# Patient Record
Sex: MALE | Race: WHITE | Employment: OTHER | URBAN - METROPOLITAN AREA
[De-identification: names, ages, dates, MRNs, and addresses within clinical notes are randomized per-mention and may not be internally consistent; named-entity substitution may affect disease eponyms.]

---

## 2017-07-11 ENCOUNTER — APPOINTMENT (OUTPATIENT)
Dept: CT IMAGING | Age: 62
DRG: 377 | End: 2017-07-11
Attending: EMERGENCY MEDICINE
Payer: COMMERCIAL

## 2017-07-11 ENCOUNTER — HOSPITAL ENCOUNTER (INPATIENT)
Age: 62
LOS: 2 days | Discharge: HOME | DRG: 377 | End: 2017-07-13
Attending: EMERGENCY MEDICINE | Admitting: INTERNAL MEDICINE
Payer: COMMERCIAL

## 2017-07-11 DIAGNOSIS — K92.2 GASTROINTESTINAL HEMORRHAGE, UNSPECIFIED GASTROINTESTINAL HEMORRHAGE TYPE: Primary | ICD-10-CM

## 2017-07-11 DIAGNOSIS — C90.00 MULTIPLE MYELOMA NOT HAVING ACHIEVED REMISSION (HCC): ICD-10-CM

## 2017-07-11 DIAGNOSIS — D64.9 ANEMIA, UNSPECIFIED TYPE: ICD-10-CM

## 2017-07-11 DIAGNOSIS — D69.6 THROMBOCYTOPENIA (HCC): ICD-10-CM

## 2017-07-11 PROBLEM — D61.810 PANCYTOPENIA DUE TO CHEMOTHERAPY (HCC): Status: ACTIVE | Noted: 2017-07-11

## 2017-07-11 PROBLEM — R19.7 DIARRHEA: Status: ACTIVE | Noted: 2017-07-11

## 2017-07-11 PROBLEM — D62 ANEMIA ASSOCIATED WITH ACUTE BLOOD LOSS: Status: ACTIVE | Noted: 2017-07-11

## 2017-07-11 PROBLEM — I95.89 HYPOTENSION, CHRONIC: Status: ACTIVE | Noted: 2017-07-11

## 2017-07-11 PROBLEM — E87.6 HYPOKALEMIA: Status: ACTIVE | Noted: 2017-07-11

## 2017-07-11 LAB
ALBUMIN SERPL BCP-MCNC: 2.2 G/DL (ref 3.5–5)
ALBUMIN/GLOB SERPL: 0.8 {RATIO} (ref 1.1–2.2)
ALP SERPL-CCNC: 107 U/L (ref 45–117)
ALT SERPL-CCNC: 25 U/L (ref 12–78)
ANION GAP BLD CALC-SCNC: 9 MMOL/L (ref 5–15)
AST SERPL W P-5'-P-CCNC: 31 U/L (ref 15–37)
ATRIAL RATE: 114 BPM
BASOPHILS # BLD AUTO: 0 K/UL (ref 0–0.1)
BASOPHILS # BLD: 0 % (ref 0–1)
BILIRUB SERPL-MCNC: 0.3 MG/DL (ref 0.2–1)
BUN SERPL-MCNC: 21 MG/DL (ref 6–20)
BUN/CREAT SERPL: 19 (ref 12–20)
CALCIUM SERPL-MCNC: 8.2 MG/DL (ref 8.5–10.1)
CALCULATED P AXIS, ECG09: 89 DEGREES
CALCULATED R AXIS, ECG10: 33 DEGREES
CALCULATED T AXIS, ECG11: 162 DEGREES
CHLORIDE SERPL-SCNC: 103 MMOL/L (ref 97–108)
CO2 SERPL-SCNC: 25 MMOL/L (ref 21–32)
CREAT SERPL-MCNC: 1.11 MG/DL (ref 0.7–1.3)
DIAGNOSIS, 93000: NORMAL
EOSINOPHIL # BLD: 0 K/UL (ref 0–0.4)
EOSINOPHIL NFR BLD: 0 % (ref 0–7)
ERYTHROCYTE [DISTWIDTH] IN BLOOD BY AUTOMATED COUNT: 19.4 % (ref 11.5–14.5)
GLOBULIN SER CALC-MCNC: 2.7 G/DL (ref 2–4)
GLUCOSE SERPL-MCNC: 114 MG/DL (ref 65–100)
HCT VFR BLD AUTO: 18.4 % (ref 36.6–50.3)
HGB BLD-MCNC: 6 G/DL (ref 12.1–17)
INR PPP: 1.1 (ref 0.9–1.1)
LYMPHOCYTES # BLD AUTO: 35 % (ref 12–49)
LYMPHOCYTES # BLD: 1.2 K/UL (ref 0.8–3.5)
MCH RBC QN AUTO: 30.6 PG (ref 26–34)
MCHC RBC AUTO-ENTMCNC: 32.6 G/DL (ref 30–36.5)
MCV RBC AUTO: 93.9 FL (ref 80–99)
MONOCYTES # BLD: 0.3 K/UL (ref 0–1)
MONOCYTES NFR BLD AUTO: 9 % (ref 5–13)
NEUTS SEG # BLD: 1.9 K/UL (ref 1.8–8)
NEUTS SEG NFR BLD AUTO: 56 % (ref 32–75)
P-R INTERVAL, ECG05: 290 MS
PLATELET # BLD AUTO: 15 K/UL (ref 150–400)
POTASSIUM SERPL-SCNC: 3.4 MMOL/L (ref 3.5–5.1)
PROT SERPL-MCNC: 4.9 G/DL (ref 6.4–8.2)
PROTHROMBIN TIME: 10.7 SEC (ref 9–11.1)
Q-T INTERVAL, ECG07: 298 MS
QRS DURATION, ECG06: 70 MS
QTC CALCULATION (BEZET), ECG08: 410 MS
RBC # BLD AUTO: 1.96 M/UL (ref 4.1–5.7)
RBC MORPH BLD: ABNORMAL
SODIUM SERPL-SCNC: 137 MMOL/L (ref 136–145)
VENTRICULAR RATE, ECG03: 114 BPM
WBC # BLD AUTO: 3.4 K/UL (ref 4.1–11.1)

## 2017-07-11 PROCEDURE — 85025 COMPLETE CBC W/AUTO DIFF WBC: CPT | Performed by: EMERGENCY MEDICINE

## 2017-07-11 PROCEDURE — 80053 COMPREHEN METABOLIC PANEL: CPT | Performed by: EMERGENCY MEDICINE

## 2017-07-11 PROCEDURE — 86880 COOMBS TEST DIRECT: CPT | Performed by: EMERGENCY MEDICINE

## 2017-07-11 PROCEDURE — 86970 RBC PRETX INCUBATJ W/CHEMICL: CPT

## 2017-07-11 PROCEDURE — 74011000250 HC RX REV CODE- 250: Performed by: INTERNAL MEDICINE

## 2017-07-11 PROCEDURE — 86860 RBC ANTIBODY ELUTION: CPT | Performed by: EMERGENCY MEDICINE

## 2017-07-11 PROCEDURE — 74178 CT ABD&PLV WO CNTR FLWD CNTR: CPT

## 2017-07-11 PROCEDURE — 77030037877 HC DRSG MEPILEX >48IN BORD MOLN -A

## 2017-07-11 PROCEDURE — 74011250636 HC RX REV CODE- 250/636: Performed by: INTERNAL MEDICINE

## 2017-07-11 PROCEDURE — 77030013131 HC IV BLD ST ICUM -A

## 2017-07-11 PROCEDURE — 86922 COMPATIBILITY TEST ANTIGLOB: CPT | Performed by: EMERGENCY MEDICINE

## 2017-07-11 PROCEDURE — 36430 TRANSFUSION BLD/BLD COMPNT: CPT

## 2017-07-11 PROCEDURE — P9037 PLATE PHERES LEUKOREDU IRRAD: HCPCS | Performed by: EMERGENCY MEDICINE

## 2017-07-11 PROCEDURE — 86870 RBC ANTIBODY IDENTIFICATION: CPT | Performed by: EMERGENCY MEDICINE

## 2017-07-11 PROCEDURE — 93005 ELECTROCARDIOGRAM TRACING: CPT

## 2017-07-11 PROCEDURE — 74011250637 HC RX REV CODE- 250/637: Performed by: INTERNAL MEDICINE

## 2017-07-11 PROCEDURE — 86885 COOMBS TEST INDIRECT QUAL: CPT

## 2017-07-11 PROCEDURE — C9113 INJ PANTOPRAZOLE SODIUM, VIA: HCPCS | Performed by: INTERNAL MEDICINE

## 2017-07-11 PROCEDURE — 74011250636 HC RX REV CODE- 250/636: Performed by: EMERGENCY MEDICINE

## 2017-07-11 PROCEDURE — 86900 BLOOD TYPING SEROLOGIC ABO: CPT | Performed by: EMERGENCY MEDICINE

## 2017-07-11 PROCEDURE — 96361 HYDRATE IV INFUSION ADD-ON: CPT

## 2017-07-11 PROCEDURE — 86920 COMPATIBILITY TEST SPIN: CPT | Performed by: EMERGENCY MEDICINE

## 2017-07-11 PROCEDURE — 86860 RBC ANTIBODY ELUTION: CPT

## 2017-07-11 PROCEDURE — 86902 BLOOD TYPE ANTIGEN DONOR EA: CPT | Performed by: EMERGENCY MEDICINE

## 2017-07-11 PROCEDURE — P9040 RBC LEUKOREDUCED IRRADIATED: HCPCS | Performed by: EMERGENCY MEDICINE

## 2017-07-11 PROCEDURE — 74011636320 HC RX REV CODE- 636/320: Performed by: RADIOLOGY

## 2017-07-11 PROCEDURE — 30233N1 TRANSFUSION OF NONAUTOLOGOUS RED BLOOD CELLS INTO PERIPHERAL VEIN, PERCUTANEOUS APPROACH: ICD-10-PCS | Performed by: EMERGENCY MEDICINE

## 2017-07-11 PROCEDURE — 86850 RBC ANTIBODY SCREEN: CPT

## 2017-07-11 PROCEDURE — 99285 EMERGENCY DEPT VISIT HI MDM: CPT

## 2017-07-11 PROCEDURE — 30233R1 TRANSFUSION OF NONAUTOLOGOUS PLATELETS INTO PERIPHERAL VEIN, PERCUTANEOUS APPROACH: ICD-10-PCS | Performed by: EMERGENCY MEDICINE

## 2017-07-11 PROCEDURE — 86880 COOMBS TEST DIRECT: CPT

## 2017-07-11 PROCEDURE — 86870 RBC ANTIBODY IDENTIFICATION: CPT

## 2017-07-11 PROCEDURE — 36415 COLL VENOUS BLD VENIPUNCTURE: CPT | Performed by: EMERGENCY MEDICINE

## 2017-07-11 PROCEDURE — 85610 PROTHROMBIN TIME: CPT | Performed by: EMERGENCY MEDICINE

## 2017-07-11 PROCEDURE — 86921 COMPATIBILITY TEST INCUBATE: CPT | Performed by: EMERGENCY MEDICINE

## 2017-07-11 PROCEDURE — 65610000006 HC RM INTENSIVE CARE

## 2017-07-11 PROCEDURE — C9113 INJ PANTOPRAZOLE SODIUM, VIA: HCPCS | Performed by: EMERGENCY MEDICINE

## 2017-07-11 PROCEDURE — 96374 THER/PROPH/DIAG INJ IV PUSH: CPT

## 2017-07-11 RX ORDER — ACETAMINOPHEN 325 MG/1
650 TABLET ORAL
Status: DISCONTINUED | OUTPATIENT
Start: 2017-07-11 | End: 2017-07-13 | Stop reason: HOSPADM

## 2017-07-11 RX ORDER — MAGNESIUM CITRATE
296 SOLUTION, ORAL ORAL 2 TIMES DAILY
Status: DISPENSED | OUTPATIENT
Start: 2017-07-11 | End: 2017-07-12

## 2017-07-11 RX ORDER — MORPHINE SULFATE 2 MG/ML
1 INJECTION, SOLUTION INTRAMUSCULAR; INTRAVENOUS
Status: DISCONTINUED | OUTPATIENT
Start: 2017-07-11 | End: 2017-07-13 | Stop reason: HOSPADM

## 2017-07-11 RX ORDER — SODIUM CHLORIDE 9 MG/ML
500 INJECTION, SOLUTION INTRAVENOUS ONCE
Status: COMPLETED | OUTPATIENT
Start: 2017-07-11 | End: 2017-07-11

## 2017-07-11 RX ORDER — OLANZAPINE 5 MG/1
5 TABLET ORAL
COMMUNITY

## 2017-07-11 RX ORDER — DIPHENHYDRAMINE HYDROCHLORIDE 50 MG/ML
12.5 INJECTION, SOLUTION INTRAMUSCULAR; INTRAVENOUS
Status: DISCONTINUED | OUTPATIENT
Start: 2017-07-11 | End: 2017-07-13 | Stop reason: HOSPADM

## 2017-07-11 RX ORDER — SODIUM CHLORIDE 9 MG/ML
250 INJECTION, SOLUTION INTRAVENOUS AS NEEDED
Status: DISCONTINUED | OUTPATIENT
Start: 2017-07-11 | End: 2017-07-13 | Stop reason: HOSPADM

## 2017-07-11 RX ORDER — NALOXONE HYDROCHLORIDE 0.4 MG/ML
0.4 INJECTION, SOLUTION INTRAMUSCULAR; INTRAVENOUS; SUBCUTANEOUS AS NEEDED
Status: DISCONTINUED | OUTPATIENT
Start: 2017-07-11 | End: 2017-07-13 | Stop reason: HOSPADM

## 2017-07-11 RX ORDER — SODIUM CHLORIDE 9 MG/ML
50 INJECTION, SOLUTION INTRAVENOUS CONTINUOUS
Status: DISCONTINUED | OUTPATIENT
Start: 2017-07-11 | End: 2017-07-13 | Stop reason: HOSPADM

## 2017-07-11 RX ORDER — DEXAMETHASONE 4 MG/1
4 TABLET ORAL
COMMUNITY

## 2017-07-11 RX ORDER — SODIUM CHLORIDE 0.9 % (FLUSH) 0.9 %
5-10 SYRINGE (ML) INJECTION EVERY 8 HOURS
Status: DISCONTINUED | OUTPATIENT
Start: 2017-07-11 | End: 2017-07-13 | Stop reason: HOSPADM

## 2017-07-11 RX ORDER — PANTOPRAZOLE SODIUM 40 MG/1
80 TABLET, DELAYED RELEASE ORAL
Status: COMPLETED | OUTPATIENT
Start: 2017-07-11 | End: 2017-07-11

## 2017-07-11 RX ORDER — PANTOPRAZOLE SODIUM 40 MG/10ML
40 INJECTION, POWDER, LYOPHILIZED, FOR SOLUTION INTRAVENOUS EVERY 12 HOURS
Status: DISCONTINUED | OUTPATIENT
Start: 2017-07-11 | End: 2017-07-13 | Stop reason: HOSPADM

## 2017-07-11 RX ORDER — PREGABALIN 50 MG/1
100 CAPSULE ORAL DAILY
Status: DISCONTINUED | OUTPATIENT
Start: 2017-07-12 | End: 2017-07-13 | Stop reason: HOSPADM

## 2017-07-11 RX ORDER — PANTOPRAZOLE SODIUM 40 MG/10ML
40 INJECTION, POWDER, LYOPHILIZED, FOR SOLUTION INTRAVENOUS
Status: COMPLETED | OUTPATIENT
Start: 2017-07-11 | End: 2017-07-11

## 2017-07-11 RX ORDER — MONTELUKAST SODIUM 10 MG/1
10 TABLET ORAL
COMMUNITY

## 2017-07-11 RX ORDER — PHENOL/SODIUM PHENOLATE
20 AEROSOL, SPRAY (ML) MUCOUS MEMBRANE 2 TIMES DAILY
COMMUNITY

## 2017-07-11 RX ORDER — ACYCLOVIR 200 MG/5ML
400 SUSPENSION ORAL EVERY 12 HOURS
Status: DISCONTINUED | OUTPATIENT
Start: 2017-07-11 | End: 2017-07-13 | Stop reason: HOSPADM

## 2017-07-11 RX ORDER — METRONIDAZOLE 500 MG/100ML
500 INJECTION, SOLUTION INTRAVENOUS EVERY 8 HOURS
Status: DISCONTINUED | OUTPATIENT
Start: 2017-07-11 | End: 2017-07-13 | Stop reason: HOSPADM

## 2017-07-11 RX ORDER — PROCHLORPERAZINE EDISYLATE 5 MG/ML
5 INJECTION INTRAMUSCULAR; INTRAVENOUS
Status: DISCONTINUED | OUTPATIENT
Start: 2017-07-11 | End: 2017-07-13 | Stop reason: HOSPADM

## 2017-07-11 RX ORDER — ACYCLOVIR 200 MG/5ML
400 SUSPENSION ORAL EVERY 12 HOURS
COMMUNITY

## 2017-07-11 RX ORDER — MIDODRINE HYDROCHLORIDE 5 MG/1
10 TABLET ORAL
Status: DISCONTINUED | OUTPATIENT
Start: 2017-07-11 | End: 2017-07-13 | Stop reason: HOSPADM

## 2017-07-11 RX ORDER — MIDODRINE HYDROCHLORIDE 10 MG/1
10 TABLET ORAL
COMMUNITY

## 2017-07-11 RX ORDER — LEVOFLOXACIN 5 MG/ML
750 INJECTION, SOLUTION INTRAVENOUS EVERY 24 HOURS
Status: DISCONTINUED | OUTPATIENT
Start: 2017-07-11 | End: 2017-07-13 | Stop reason: HOSPADM

## 2017-07-11 RX ORDER — PREGABALIN 50 MG/1
100 CAPSULE ORAL DAILY
COMMUNITY

## 2017-07-11 RX ORDER — SODIUM CHLORIDE 0.9 % (FLUSH) 0.9 %
5-10 SYRINGE (ML) INJECTION AS NEEDED
Status: DISCONTINUED | OUTPATIENT
Start: 2017-07-11 | End: 2017-07-13 | Stop reason: HOSPADM

## 2017-07-11 RX ADMIN — PHENYLEPHRINE HYDROCHLORIDE 30 MCG/MIN: 10 INJECTION INTRAVENOUS at 12:40

## 2017-07-11 RX ADMIN — MIDODRINE HYDROCHLORIDE 10 MG: 5 TABLET ORAL at 17:18

## 2017-07-11 RX ADMIN — SODIUM CHLORIDE 1000 ML: 900 INJECTION, SOLUTION INTRAVENOUS at 06:53

## 2017-07-11 RX ADMIN — PANTOPRAZOLE SODIUM 40 MG: 40 INJECTION, POWDER, FOR SOLUTION INTRAVENOUS at 06:53

## 2017-07-11 RX ADMIN — PANTOPRAZOLE SODIUM 80 MG: 40 TABLET, DELAYED RELEASE ORAL at 08:01

## 2017-07-11 RX ADMIN — Medication 10 ML: at 21:24

## 2017-07-11 RX ADMIN — SODIUM CHLORIDE 500 ML: 900 INJECTION, SOLUTION INTRAVENOUS at 11:36

## 2017-07-11 RX ADMIN — METRONIDAZOLE 500 MG: 500 INJECTION, SOLUTION INTRAVENOUS at 19:52

## 2017-07-11 RX ADMIN — SODIUM CHLORIDE 50 ML/HR: 900 INJECTION, SOLUTION INTRAVENOUS at 15:20

## 2017-07-11 RX ADMIN — ACYCLOVIR 400 MG: 200 SUSPENSION ORAL at 16:05

## 2017-07-11 RX ADMIN — LEVOFLOXACIN 750 MG: 5 INJECTION, SOLUTION INTRAVENOUS at 11:53

## 2017-07-11 RX ADMIN — Medication 20 ML: at 09:51

## 2017-07-11 RX ADMIN — PANTOPRAZOLE SODIUM 40 MG: 40 INJECTION, POWDER, FOR SOLUTION INTRAVENOUS at 17:18

## 2017-07-11 RX ADMIN — PHENYLEPHRINE HYDROCHLORIDE 70 MCG/MIN: 10 INJECTION INTRAVENOUS at 13:33

## 2017-07-11 RX ADMIN — METRONIDAZOLE 500 MG: 500 INJECTION, SOLUTION INTRAVENOUS at 13:04

## 2017-07-11 RX ADMIN — PHENYLEPHRINE HYDROCHLORIDE 50 MCG/MIN: 10 INJECTION INTRAVENOUS at 13:02

## 2017-07-11 RX ADMIN — ACYCLOVIR 400 MG: 200 SUSPENSION ORAL at 21:24

## 2017-07-11 RX ADMIN — SODIUM CHLORIDE 50 ML/HR: 900 INJECTION, SOLUTION INTRAVENOUS at 20:34

## 2017-07-11 RX ADMIN — IOPAMIDOL 95 ML: 755 INJECTION, SOLUTION INTRAVENOUS at 09:13

## 2017-07-11 NOTE — H&P
[address]  [phone]    Admission History and Physical      NAME:              Janet Piper II   :   1955   MRN:  [id number]     PCP:  None     Date:     2017     Chief  Complaint: Bloody diarrhea    History Of Presenting Illness:       Mr. Esperanza Waller is a 57 y.o. male who is being admitted for acute GI bleeding. Mr. Esperanza Waller presented to our Emergency Department today complaining of acute onset of melena associated with bloody diarrhea. He had several episodes prior to coming to the ED. He denies any nausea, vomiting, fever, hematemesis or much abdominal discomfort. He has a hx of multiple myeloma and usually follows up at [facility] where he received platelets yesterday. He is due for chemotherapy Thursday. He was found to be hypotensive with pancytopenia. [facility] is on diversion and are not able to accept him today. He will be admitted for further management. No Known Allergies    Prior to Admission medications    Medication Sig Start Date End Date Taking? Authorizing Provider   pregabalin (LYRICA) 50 mg capsule Take 50 mg by mouth three (3) times daily. Yes Carolyn Espitia MD   midodrine (PROAMITINE) 10 mg tablet Take 10 mg by mouth three (3) times daily (with meals). Yes Carolyn Espitia MD   montelukast (SINGULAIR) 10 mg tablet Take 10 mg by mouth daily. Yes Carolyn Espitia MD   Omeprazole delayed release (PRILOSEC D/R) 20 mg tablet Take 20 mg by mouth daily. Yes Carolyn Espitia MD   acyclovir (ZOVIRAX) 200 mg/5 mL suspension Take 200 mg by mouth every twelve (12) hours. Yes Carolyn Espitia MD   dexamethasone (DECADRON) 1 mg tablet Take 4 mg by mouth two (2) days a week. Yes Carolyn Espitia MD   valACYclovir (VALTREX) 1 gram tablet Take 1,000 mg by mouth daily. Yes Carolyn Espitia MD   Biotin 2,500 mcg cap Take  by mouth. Yes Carolyn Espitia MD   multivitamin (ONE A DAY) tablet Take 1 Tab by mouth daily. Yes Carolyn Espitia MD   lenalidomide (REVLIMID) 20 mg cap Take 20 mg by mouth daily. 21 days    Carolyn Espitia MD   carfilzomib (KYPROLIS) 60 mg injection by IntraVENous route. Carolyn Espitia MD   rivaroxaban (XARELTO) 15 mg tab tablet Take 15 mg by mouth daily. Carolyn Espitia MD   nortriptyline (PAMELOR) 25 mg capsule Take 25 mg by mouth nightly. Carolyn Espitia MD   metoprolol (LOPRESSOR) 25 mg tablet Take 37.5 mg by mouth daily. Carolyn Espitia MD   testosterone (ANDROGEL) 1 % (25 mg/2.5gram) glpk 25 mg by TransDERmal route daily. Carolyn Espitia MD   calcium 500 mg tab Take  by mouth. Carolyn Espitia MD       Past Medical History:   Diagnosis Date    Multiple myeloma ([facility] [address]         Past Surgical History:   Procedure Laterality Date    HX ORTHOPAEDIC      R hip    HX ORTHOPAEDIC      titanium rods in back       Social History   Substance Use Topics    Smoking status: Never Smoker    Smokeless tobacco: Never Used    Alcohol use Yes      Comment: socially, wine        Family History   Problem Relation Age of Onset    Diabetes Neg Hx         Review of Systems:    Constitutional ROS: no fever, chills, rigors or night sweats  Respiratory ROS: no cough, sputum, hemoptysis, dyspnea or pleuritic pain. Cardiovascular ROS: no chest pain, palpitations, orthopnea, PND or syncope  Endocrine ROS: no polydispsia, polyuria, heat or cold intolerance or major weight change.   Gastrointestinal ROS: no dysphagia, abdominal pain, nausea or vomiting    Genito-Urinary ROS: no dysuria, frequency, hematuria, retention or flank pain  Musculoskeletal ROS: no joint pain, swelling or muscular tenderness  Neurological ROS: no headache, confusion, focal weakness or any other neurological symptoms  Psychiatric ROS: no depression, anxiety, mood swings  Dermatological ROS: no rash, pruritis, or urticaria  Heme-Lymph ROS: no swollen glands, bleeding    Examination:    Constitutional:    Visit Vitals    BP (!) 84/40    Pulse (!) 105    Temp 99.1 °F (37.3 °C)    Resp 18    Ht 5' 5\" (1.651 m)    Wt 56.2 kg (124 lb)    SpO2 100%    BMI 20.63 kg/m2      General:  Weak and ill looking patient, not in much acute distress   Eyes: Pale conjunctivae, PERRLA with no discharge. Normal eye movements  Ear, Nose, Mouth & Throat: No ottorrhea, rhinorrhea, non tender sinuses, dry mucous membranes  Respiratory:  No accessory muscle use, clear breath sounds without crackles or wheezes  Cardiovascular:  No JVD or murmurs, regular and normal S1, S2 without thrills, bruits or peripheral edema. GI & :  Soft abdomen, non-tender, non-distended, normoactive bowel sounds with no palpable organomegaly  Heme:  No cervical or axillary adenopathy. Musculoskeletal:  No cyanosis, clubbing, atrophy or deformities  Skin:  No rashes, bruising or ulcers   Neurological: Awake and alert, speech is clear, CNs 2-12 are grossly intact and otherwise non focal  Psychiatric:  Has a fair insight and is oriented x 3  ________________________________________________________________________    Data Review:    Labs:    Recent Labs      07/11/17   0550   WBC  3.4*   HGB  6.0*   HCT  18.4*   PLT  15*     Recent Labs      07/11/17   0550   NA  137   K  3.4*   CL  103   CO2  25   GLU  114*   BUN  21*   CREA  1.11   CA  8.2*   ALB  2.2*   SGOT  31   ALT  25     No components found for: GLPOC  No results for input(s): PH, PCO2, PO2, HCO3, FIO2 in the last 72 hours. Recent Labs      07/11/17   0550   INR  1.1     Other Medical tests:    Personally reviewed 12 lead EKG: Normal rate, rhythm, axis and intervals. and No acute changes suggestive of ischemia    I have also reviewed available old medical records.      Assessment & Impression:     Mr. Amish Bowen is a 57 y.o. male being evaluated for:     Principal Problem:    Acute GI bleeding (7/11/2017)    Active Problems:    Anemia associated with acute blood loss (7/11/2017)      Thrombocytopenia ([address] (7/11/2017)      Hypokalemia (7/11/2017)      Pancytopenia due to chemotherapy ([address] (7/11/2017)      Multiple myeloma ([address] (7/11/2017)      Diarrhea (7/11/2017)      Hypotension, chronic (7/11/2017)         Plan of management:    Acute GI bleeding (7/11/2017): admit to ICU due to risk of rapid deterioration. Start IV pantoprazole. Seen by GI. Obtaining a CTA abdomen. Transfuse as noted below. Pancytopenia due to chemotherapy ([address] (7/11/2017)/ Anemia associated with acute blood loss (7/11/2017)/ Thrombocytopenia ([address] (7/11/2017): transfuse platelets as well as PRBCs. Monitor labs post transfusion. Hypotension, chronic (7/11/2017): worsened by diarrhea. Resume Midodrine and transfuse with PRBCs. Multiple myeloma ([address] (7/11/2017): [place] [facility] Consult in house Oncology. Hypokalemia (7/11/2017): replete and follow BMP    Diarrhea (7/11/2017): presumed non infectious.  Will obtain stool cultures if persistent    Code Status:  Full    Surrogate decision maker: Family    Risk of deterioration: high      Total time spent for the care of the patient: Meng Randall 1952 discussed with: Patient, Family, Nursing Staff and ED physician    Discussed:  Code Status, Care Plan and D/C Planning    Prophylaxis:  H2B/PPI    Probable Disposition:  Home w/Family           ___________________________________________________    Attending Physician: Cindy Berman MD

## 2017-07-11 NOTE — IP AVS SNAPSHOT
Summary of Care Report The Summary of Care report has been created to help improve care coordination. Users with access to [employer] or [address] (Web-based application) may access additional patient information including the Discharge Summary. If you are not currently a [address] user and need more information, please call the number listed below in the [place] [address] section and ask to be connected with Medical Records. Facility Information Name Address Phone [address] [place] [id number] [phone] Patient Information Patient Name Sex WAQAS Swartz ([id number]) Male 1955 Discharge Information Admitting Provider Service Area Unit George Lai MD / Natalie Hutchison 3 Intensive Care / [phone] Discharge Provider Discharge Date/Time Discharge Disposition Destination (none) 2017 Afternoon (Pending) AHR (none) Patient Language Language ENGLISH [13] Hospital Problems as of 2017  Reviewed: 2017  7:41 AM by George Lai MD  
  
  
  
 Class Noted - Resolved Last Modified POA Active Problems * (Principal)Acute GI bleeding  2017 - Present 2017 by George Lai MD Yes Entered by George Lai MD  
  Anemia associated with acute blood loss  2017 - Present 2017 by George Lai MD Yes Entered by George Lai MD  
  Thrombocytopenia ([address]  2017 - Present 2017 by George Lai MD Yes Entered by George Lai MD  
  Hypokalemia  2017 - Present 2017 by George Lai MD Yes Entered by George Lai MD  
  Pancytopenia due to chemotherapy [facility])  2017 - Present 2017 by George Lai MD Yes   Entered by George Lai MD  
 Multiple myeloma ([facility] [address]  7/11/2017 - Present 7/11/2017 by Shruthi Delatorre MD Yes Entered by Shruthi Delatorre MD  
  Diarrhea  7/11/2017 - Present 7/11/2017 by Shruthi Delatorre MD Yes Entered by Shruthi Delatorre MD  
  Hypotension, chronic  7/11/2017 - Present 7/11/2017 by Shruthi Delatorre MD Yes Entered by Shruthi Delatorre MD  
  Acute colitis  7/12/2017 - Present 7/12/2017 by Shruthi Delatorre MD Yes Entered by Shruthi Delatorre MD  
  
Non-Hospital Problems as of 7/13/2017  Reviewed: 7/11/2017  7:41 AM by Shruthi Delatorre MD  
 None You are allergic to the following No active allergies Current Discharge Medication List  
  
START taking these medications Dose & Instructions Dispensing Information Comments  
 levoFLOXacin 750 mg tablet Commonly known as:  Loistine Dandre Dose:  750 mg Take 1 Tab by mouth daily for 5 days. Quantity:  5 Tab Refills:  0  
   
 metroNIDAZOLE 500 mg tablet Commonly known as:  FLAGYL Dose:  500 mg Take 1 Tab by mouth three (3) times daily for 5 days. Quantity:  15 Tab Refills:  0 CONTINUE these medications which have NOT CHANGED Dose & Instructions Dispensing Information Comments  
 acyclovir 200 mg/5 mL suspension Commonly known as:  ZOVIRAX Dose:  400 mg Take 400 mg by mouth every twelve (12) hours. Refills:  0  
   
 daratumumab 20 mg/mL injection Commonly known as:  DARZALEX  
 by IntraVENous route Every Thursday. Immunotherapy administered at [facility] Refills:  0  
   
 dexamethasone 4 mg tablet Commonly known as:  DECADRON Dose:  4 mg Take 4 mg by mouth Every Friday. The day after immunotherapy Refills:  0 LYRICA 50 mg capsule Generic drug:  pregabalin Dose:  100 mg Take 100 mg by mouth daily. Refills:  0  
   
 midodrine 10 mg tablet Commonly known as:  Maurie Paulo Dose:  10 mg Take 10 mg by mouth three (3) times daily (with meals). Refills:  0 montelukast 10 mg tablet Commonly known as:  SINGULAIR Dose:  10 mg Take 10 mg by mouth two (2) days a week. The day before and day of immunotherapy. Based on current schedule, take on Thursday and Friday. Refills:  0  
   
 multivitamin tablet Commonly known as:  ONE A DAY Dose:  1 Tab Take 1 Tab by mouth daily. Refills:  0  
   
 OLANZapine 5 mg tablet Commonly known as:  ZyPREXA Dose:  5 mg Take 5 mg by mouth nightly as needed. Refills:  0 Omeprazole delayed release 20 mg tablet Commonly known as:  PRILOSEC D/R Dose:  20 mg Take 20 mg by mouth two (2) times a day. Refills:  0 Surgery Information ID Date/Time Status Primary Surgeon All Procedures Location [id number] 2017 Canceled Naresh Munoz MD ESOPHAGOGASTRODUODENOSCOPY (EGD) [facility] ENDOSCOPY Follow-up Information Follow up With Details Comments Contact Info None   None ([phone]) Patient stated that they have no PCP Discharge Instructions HOSPITALIST DISCHARGE INSTRUCTIONS 
 
NAME: Emily Reyes II  
:  1955 MRN:  [id number] Date:     2017 ADMIT DATE: 2017 DISCHARGE DATE: 2017 PRINCIPAL ADMITTING DIAGNOSIS: 
Acute GI bleeding GI BLEED DISCHARGE DIAGNOSES: 
Principal Problem: 
  Acute GI bleeding (2017) Anemia associated with acute blood loss (2017) Thrombocytopenia ([facility] [address] (2017) Hypokalemia (2017) Pancytopenia due to chemotherapy ([facility] [address] (2017) Multiple myeloma ([facility] [address] (2017) Hypotension, chronic (2017) Acute colitis (2017) MEDICATIONS: 
 
· It is important that medications are taken exactly as they are prescribed on the discharge medication instructions and keep them your  in the bottles provided by the pharmacist.  
· Keep a list of the medication names, dosages, and times to be taken at all times. · Do not take other medications without consulting your doctor. Recommended diet:  Regular mechanical soft Diet Recommended activity: Activity as tolerated Post discharge care: 
 
Notify follow up health care provider or return to the emergency department if you cannot get hold of your doctor if you feel worse or experience symptoms similar to those that brought you to hospital 
 
Follow-up Information Follow up With Details Comments Contact Info Dr Tariq Delgadillo at [address] [facility] as scheduled Information obtained by : 
I understand that if any problems occur once I am at home I am to contact my physician and I understand and acknowledge receipt of the instructions indicated above. Physician's or R.N.'s Signature                                                                  Date/Time Patient or Representative Signature                                                          Date/Time Chart Review Routing History No Routing History on File

## 2017-07-11 NOTE — PROGRESS NOTES
[facility]: MED RECONCILIATION    Comments/Recommendations:    Patient awake to confirm date birth and allergies. Wife brought medication bottles and was knowledegable about patient's current medication regimen.  Patient receives daratumumab once weekly on Thursdays at [facility]. At that time, he takes dexamethasone and montelukast.    Asked if patient is on any blood thinners. Wife denies and says he was previously on rivaroxaban but has not had that since before January. Medications added:     · Olanzapine  · Darautumumab injection    Medications removed:    · Biotin  · Calcium  · Carfilzomib   · Lenalidomide  · Metoprolol  · Nortriptyline   · Rivaroxaban  · Testosterone gel  · Valacyclovir     Medication reconciliation completed with patients own medications at the patients bedside  · Counseled the patient to:  · Not use their own medication while in the hospital unless otherwise instructed  · Have a family member take the medication home as soon as possible  · If medication cannot be taken home, request that the patient notify the nurse so the medication may be locked up according to hospital policy    Information obtained from: patient's wife    Allergies: Review of patient's allergies indicates no known allergies. Prior to Admission Medications:     Prior to Admission Medications   Prescriptions Last Dose Informant Patient Reported? Taking? OLANZapine (ZYPREXA) 5 mg tablet 6/27/2017 Significant Other Yes Yes   Sig: Take 5 mg by mouth nightly as needed. Omeprazole delayed release (PRILOSEC D/R) 20 mg tablet 7/10/2017 at Unknown time Significant Other Yes Yes   Sig: Take 20 mg by mouth two (2) times a day. acyclovir (ZOVIRAX) 200 mg/5 mL suspension 7/10/2017 Significant Other Yes Yes   Sig: Take 400 mg by mouth every twelve (12) hours. daratumumab (DARZALEX) 20 mg/mL injection 7/6/2017 Significant Other Yes Yes   Sig: by IntraVENous route Every Thursday.  Immunotherapy administered at [facility]   dexamethasone (DECADRON) 4 mg tablet 7/7/2017 Significant Other Yes Yes   Sig: Take 4 mg by mouth Every Friday. The day after immunotherapy    midodrine (PROAMITINE) 10 mg tablet 7/10/2017 Significant Other Yes Yes   Sig: Take 10 mg by mouth three (3) times daily (with meals). montelukast (SINGULAIR) 10 mg tablet 7/6/2017 at Unknown time Significant Other Yes Yes   Sig: Take 10 mg by mouth two (2) days a week. The day before and day of immunotherapy. Based on current schedule, take on Thursday and Friday. multivitamin (ONE A DAY) tablet 7/10/2017 at Unknown time Significant Other Yes Yes   Sig: Take 1 Tab by mouth daily. pregabalin (LYRICA) 50 mg capsule 7/10/2017 at Unknown time Significant Other Yes Yes   Sig: Take 100 mg by mouth daily.           Cami Tran, PharmD Candidate [id number]

## 2017-07-11 NOTE — ED TRIAGE NOTES
Pt arrives via EMS for c/o of dark stools. Per pt he is followed by [facility] for coumadin level. Pt a&ox4. Per wife pt had dark tarry stools upon waking.  Per EMS BP 81/47

## 2017-07-11 NOTE — PROGRESS NOTES
Nurse manager informed me that pt and his wife have requested a transfer to [facility] ibuprofen discussed this with Dr Elbert Templeton and then I contacted [facility] transfer center requesting an ICU bed for pt. [facility] remains on diversion. [facility] states they do not know when diversion will be lifted  I will call the [facility] transfer center every two hours to check on their status regarding diversion. I have faxed the face sheet with demographics to the [facility] transfer center @ [phone]. The number to call to try to get pt into [facility] after I leave today is [phone]. I met with pt and his wife to discuss the plan. At this time,pt does not any endoscopy procedures performed here. Pt & wife were informed that it may be several days before [facility] goes off of diversion or it could be today or tomorrow. Pt.'s wife plans to call pt.'s oncologist (Dr Juan Beckford) to get his opinion regarding endoscopy. At home pt uses a wheelchair,standing device,BSC,sliding board,and hospital bed. Son has moved back into pt.'s home to assist pt with his care. At home,pt receives bed baths. Wife prefers not to have home health unless absolutely necessary. Plan discussed with Dr Elbert Templeton and pt.'s nurse.   Mavis Brower

## 2017-07-11 NOTE — IP AVS SNAPSHOT
Liz Rangel 
 
 
 [address] [phone] Patient: Missy Wolfe 
MRN: [id number] PEM:[id number] You are allergic to the following No active allergies Recent Documentation Height Weight BMI Smoking Status 1.651 m 57 kg 20.91 kg/m2 Never Smoker Unresulted Labs Order Current Status SAMPLE TO BLOOD BANK In process SAMPLE TO BLOOD BANK In process SAMPLE TO BLOOD BANK In process SAMPLE TO BLOOD BANK In process CULTURE, STOOL Preliminary result PLATELETS, ALLOCATE Preliminary result TYPE & CROSSMATCH Preliminary result Emergency Contacts Name Discharge Info Relation Home Work Mobile HetalFronto Lyndsay [employer] 3. CAREGIVER [3] Spouse [3] [phone] About your hospitalization You were admitted on:  July 11, 2017 You last received care in the:  [facility] [place] [address] [facility] You were discharged on:  July 13, 2017 Unit phone number:  [phone] Why you were hospitalized Your primary diagnosis was:  Acute Gi Bleeding Your diagnoses also included:  Anemia Associated With Acute Blood Loss, Thrombocytopenia (Hcc), Hypokalemia, Pancytopenia Due To Chemotherapy (Hcc), Multiple Myeloma (Hcc), Diarrhea, Hypotension, Chronic, Acute Colitis Providers Seen During Your Hospitalizations Provider Role Specialty Primary office phone Shantell Stroud MD Attending Provider Emergency Medicine [phone] Livan Hernandez MD Attending Provider Internal Medicine [phone] Your Primary Care Physician (PCP) Primary Care Physician Office Phone Office Fax NONE ** None ** ** None ** Follow-up Information Follow up With Details Comments Contact Info None   None (395) Patient stated that they have no PCP Current Discharge Medication List  
  
START taking these medications Dose & Instructions Dispensing Information Comments Morning Noon Evening Bedtime  
 levoFLOXacin 750 mg tablet Commonly known as:  Jaimie Ramos Your last dose was: Your next dose is:    
   
   
 Dose:  750 mg Take 1 Tab by mouth daily for 5 days. Quantity:  5 Tab Refills:  0  
     
   
   
   
  
 metroNIDAZOLE 500 mg tablet Commonly known as:  FLAGYL Your last dose was: Your next dose is:    
   
   
 Dose:  500 mg Take 1 Tab by mouth three (3) times daily for 5 days. Quantity:  15 Tab Refills:  0 CONTINUE these medications which have NOT CHANGED Dose & Instructions Dispensing Information Comments Morning Noon Evening Bedtime  
 acyclovir 200 mg/5 mL suspension Commonly known as:  ZOVIRAX Your last dose was: Your next dose is:    
   
   
 Dose:  400 mg Take 400 mg by mouth every twelve (12) hours. Refills:  0  
     
   
   
   
  
 daratumumab 20 mg/mL injection Commonly known as:  Atlalon Roquea Your last dose was: Your next dose is:    
   
   
 by IntraVENous route Every Thursday. Immunotherapy administered at [facility] Refills:  0  
     
   
   
   
  
 dexamethasone 4 mg tablet Commonly known as:  DECADRON Your last dose was: Your next dose is:    
   
   
 Dose:  4 mg Take 4 mg by mouth Every Friday. The day after immunotherapy Refills:  0 LYRICA 50 mg capsule Generic drug:  pregabalin Your last dose was: Your next dose is:    
   
   
 Dose:  100 mg Take 100 mg by mouth daily. Refills:  0  
     
   
   
   
  
 midodrine 10 mg tablet Commonly known as:  Anel Paulino Your last dose was: Your next dose is:    
   
   
 Dose:  10 mg Take 10 mg by mouth three (3) times daily (with meals). Refills:  0  
     
   
   
   
  
 montelukast 10 mg tablet Commonly known as:  SINGULAIR  
   
 Your last dose was: Your next dose is:    
   
   
 Dose:  10 mg Take 10 mg by mouth two (2) days a week. The day before and day of immunotherapy. Based on current schedule, take on Thursday and Friday. Refills:  0  
     
   
   
   
  
 multivitamin tablet Commonly known as:  ONE A DAY Your last dose was: Your next dose is:    
   
   
 Dose:  1 Tab Take 1 Tab by mouth daily. Refills:  0  
     
   
   
   
  
 OLANZapine 5 mg tablet Commonly known as:  ZyPREXA Your last dose was: Your next dose is:    
   
   
 Dose:  5 mg Take 5 mg by mouth nightly as needed. Refills:  0 Omeprazole delayed release 20 mg tablet Commonly known as:  PRILOSEC D/R Your last dose was: Your next dose is:    
   
   
 Dose:  20 mg Take 20 mg by mouth two (2) times a day. Refills:  0 Where to Get Your Medications Information on where to get these meds will be given to you by the nurse or doctor. ! Ask your nurse or doctor about these medications  
  levoFLOXacin 750 mg tablet  
 metroNIDAZOLE 500 mg tablet Discharge Instructions HOSPITALIST DISCHARGE INSTRUCTIONS 
 
NAME: Natalee Rossi VANIA  
:  1955 MRN:  [id number] Date:     2017 ADMIT DATE: 2017 DISCHARGE DATE: 2017 PRINCIPAL ADMITTING DIAGNOSIS: 
Acute GI bleeding GI BLEED DISCHARGE DIAGNOSES: 
Principal Problem: 
  Acute GI bleeding (2017) Anemia associated with acute blood loss (2017) Thrombocytopenia ([address] (2017) Hypokalemia (2017) Pancytopenia due to chemotherapy ([address] (2017) Multiple myeloma ([address] (2017) Hypotension, chronic (2017) Acute colitis (2017) MEDICATIONS: 
 
· It is important that medications are taken exactly as they are prescribed on the discharge medication instructions and keep them your  in the bottles provided by the pharmacist.  
· Keep a list of the medication names, dosages, and times to be taken at all times. · Do not take other medications without consulting your doctor. Recommended diet:  Regular mechanical soft Diet Recommended activity: Activity as tolerated Post discharge care: 
 
Notify follow up health care provider or return to the emergency department if you cannot get hold of your doctor if you feel worse or experience symptoms similar to those that brought you to hospital 
 
Follow-up Information Follow up With Details Comments Contact Info Dr Roxy Lowe at [facility] as scheduled Information obtained by : 
I understand that if any problems occur once I am at home I am to contact my physician and I understand and acknowledge receipt of the instructions indicated above. Physician's or R.N.'s Signature                                                                  Date/Time Patient or Representative Signature                                                          Date/Time Discharge Orders None [employer] Announcement We are excited to announce that we are making your provider's discharge notes available to you in [employer]. You will see these notes when they are completed and signed by the physician that discharged you from your recent hospital stay. If you have any questions or concerns about any information you see in [employer], please call the Health Information Department where you were seen or reach out to your Primary Care Provider for more information about your plan of care. Introducing [facility] SERVICES! [facility] introduces [email or web address] patient portal. Now you can access parts of your medical record, email your doctor's office, and request medication refills online. 1. In your internet browser, go to [email or web address] 2. Click on the First Time User? Click Here link in the Sign In box. You will see the New Member Sign Up page. 3. Enter your [email or web address] Access Code exactly as it appears below. You will not need to use this code after youve completed the sign-up process. If you do not sign up before the expiration date, you must request a new code. · [email or web address] Access Code: [email or web address] Expires: 10/9/2017  6:38 AM 
 
4. Enter the last four digits of your Social Security Number (xxxx) and Date of Birth (mm/dd/yyyy) as indicated and click Submit. You will be taken to the next sign-up page. 5. Create a [email or web address] ID. This will be your [email or web address] login ID and cannot be changed, so think of one that is secure and easy to remember. 6. Create a [email or web address] password. You can change your password at any time. 7. Enter your Password Reset Question and Answer. This can be used at a later time if you forget your password. 8. Enter your e-mail address. You will receive e-mail notification when new information is available in [address] 9. Click Sign Up. You can now view and download portions of your medical record. 10. Click the Download Summary menu link to download a portable copy of your medical information. If you have questions, please visit the Frequently Asked Questions section of the [email or web address] website. Remember, [email or web address] is NOT to be used for urgent needs. For medical emergencies, dial 911. Now available from your iPhone and Android! General Information Please provide this summary of care documentation to your next provider. Patient Signature:  ____________________________________________________________ Date:  ____________________________________________________________  
  
Arna Magen Provider Signature:  ____________________________________________________________ Date:  ____________________________________________________________

## 2017-07-11 NOTE — PROGRESS NOTES
Problem: Upper and Lower GI Bleed: Day 1  Goal: Diagnostic Test/Procedures  Outcome: Not Progressing Towards Goal  Patient refusing endo at this time. Waiting to hear from his oncologist.  Goal: Nutrition/Diet  Outcome: Not Progressing Towards Goal  NPO  Goal: Discharge Planning  Outcome: Not Progressing Towards Goal  Awaiting word from [facility]  Goal: Treatments/Interventions/Procedures  Outcome: Not Progressing Towards Goal  Awaiting word from oncologist about endo  Goal: Psychosocial  Outcome: Not Progressing Towards Goal  Wife very anxious. Support offered.   Goal: *Hemodynamically stable  Outcome: Not Progressing Towards Goal  SBP still low

## 2017-07-11 NOTE — CONSULTS
[address] [facility] Oncology at [facility]  [phone]    Hematology / Oncology Consult    Reason for Visit:   Markie Anand is a 57 y.o. male who is seen in consultation at the request of Dr. Tej Sterling  for evaluation of GI bleed, Multiple myeloma and thrombocytopenia. History of Present Illness:     Mr Jenniffer Huizar was admitted on 7/11/2017 from the ED when he presented with c/o acute bloody diarrhea. Hx of MM and is currently undergoing treatment at [facility] under the care of Dr Rich Mahoney. Has been receiving supportive care of  plt transfusions prn. ED labs Hgb 6 WBC 3.4  ANC 1.9  plt 15. Therefore he was admitted for further eval and management. Mr Jenniffer Huizar denies any pain, SOB or N/V. States appetite has been good for the last several weeks. Hx from his wife, Spenser Certain states her  woke up about 4:30 am and had a large BM that looked black and tarry with some bright red blood noted; then had a second one within a few minutes; she got nervous and called 911; before they could arrive he had another stool with blood noted as well. Denies blood in urine or nose bleeds or any emesis with blood. Reports he has had fevers off and on; running from 99.3-100.4. Has had admissions for fevers but they can never find the source. Has been on recent numerous abx in the hospital and was sent home with Levaquin and he just completed that a few days ago. His appetite has really improved over the last several days since he was started on Protonix and  Sucralfate. Has been mostly in the bed or couch since Jan when he had the fx femur. Was due to start PT at home. Has upper body strength so has been able to transfer himself to the car from the  with a board. Has been receiving mainly plt transfusions at [facility].      [facility] records reviewed     Past Medical History:   Diagnosis Date    Multiple myeloma [facility])       Past Surgical History:   Procedure Laterality Date    HX ORTHOPAEDIC      Rt hip, lt hip and femur, rt. humerus    HX ORTHOPAEDIC      titanium rods in back      Social History   Substance Use Topics    Smoking status: Never Smoker    Smokeless tobacco: Never Used    Alcohol use Yes      Comment: socially, wine      Family History   Problem Relation Age of Onset    Diabetes Neg Hx      Current Facility-Administered Medications   Medication Dose Route Frequency    0.9% sodium chloride infusion 250 mL  250 mL IntraVENous PRN    0.9% sodium chloride infusion 250 mL  250 mL IntraVENous PRN    0.9% sodium chloride infusion 250 mL  250 mL IntraVENous PRN    sodium chloride (NS) flush 5-10 mL  5-10 mL IntraVENous Q8H    sodium chloride (NS) flush 5-10 mL  5-10 mL IntraVENous PRN    acetaminophen (TYLENOL) tablet 650 mg  650 mg Oral Q4H PRN    morphine injection 1 mg  1 mg IntraVENous Q4H PRN    naloxone (NARCAN) injection 0.4 mg  0.4 mg IntraVENous PRN    diphenhydrAMINE (BENADRYL) injection 12.5 mg  12.5 mg IntraVENous Q4H PRN    prochlorperazine (COMPAZINE) injection 5 mg  5 mg IntraVENous Q8H PRN    pantoprazole (PROTONIX) injection 40 mg  40 mg IntraVENous Q12H    acyclovir (ZOVIRAX) 200 mg/5 mL oral suspension 400 mg  400 mg Oral Q12H    midodrine (PROAMITINE) tablet 10 mg  10 mg Oral TID WITH MEALS    levoFLOXacin (LEVAQUIN) 750 mg in D5W IVPB  750 mg IntraVENous Q24H    metroNIDAZOLE (FLAGYL) IVPB premix 500 mg  500 mg IntraVENous Q8H    PHENYLephrine (NEOSYNEPHRINE) 30,000 mcg in 0.9% sodium chloride 250 mL infusion   mcg/min IntraVENous TITRATE      No Known Allergies     Review of Systems: A complete review of systems was obtained, negative except as described above.     Physical Exam:     Visit Vitals    BP (!) 85/43    Pulse (!) 104    Temp 99.1 °F (37.3 °C)    Resp 20    Ht 5' 5\" (1.651 m)    Wt 57 kg (125 lb 10.6 oz)    SpO2 100%    BMI 20.91 kg/m2     ECOG PS: 4  General: No distress  Eyes: PERRLA, anicteric sclerae  HENT: Atraumatic, OP clear  Neck: Supple  Lymphatic: No cervical, supraclavicular, or inguinal adenopathy  Respiratory: CTAB, normal respiratory effort  CV: Normal rate, regular rhythm, no murmurs, 1+ lower extremity edema  GI: Soft, nontender, nondistended, no masses, no hepatomegaly, no splenomegaly  Skin: No rashes, ecchymoses, or petechiae. Normal temperature, turgor, and texture. Psych: Alert, oriented, appropriate affect, normal judgment/insight  Neuro: CN II-XII intact    Results:     Lab Results   Component Value Date/Time    WBC 3.4 07/11/2017 05:50 AM    HGB 6.0 07/11/2017 05:50 AM    HCT 18.4 07/11/2017 05:50 AM    PLATELET 15 31/00/3073 05:50 AM    MCV 93.9 07/11/2017 05:50 AM    ABS. NEUTROPHILS 1.9 07/11/2017 05:50 AM     Lab Results   Component Value Date/Time    Sodium 137 07/11/2017 05:50 AM    Potassium 3.4 07/11/2017 05:50 AM    Chloride 103 07/11/2017 05:50 AM    CO2 25 07/11/2017 05:50 AM    Glucose 114 07/11/2017 05:50 AM    BUN 21 07/11/2017 05:50 AM    Creatinine 1.11 07/11/2017 05:50 AM    GFR est AA >60 07/11/2017 05:50 AM    GFR est non-AA >60 07/11/2017 05:50 AM    Calcium 8.2 07/11/2017 05:50 AM     Lab Results   Component Value Date/Time    Bilirubin, total 0.3 07/11/2017 05:50 AM    ALT (SGPT) 25 07/11/2017 05:50 AM    AST (SGOT) 31 07/11/2017 05:50 AM    Alk. phosphatase 107 07/11/2017 05:50 AM    Protein, total 4.9 07/11/2017 05:50 AM    Albumin 2.2 07/11/2017 05:50 AM    Globulin 2.7 07/11/2017 05:50 AM     Lab Results   Component Value Date/Time    MORRIS Poly POS 07/11/2017 05:50 AM     Lab Results   Component Value Date/Time    INR 1.1 07/11/2017 05:50 AM     7/11/2017 CT A/P W WO CONT  IMPRESSION:     1. Discontinuous multifocal colitis is most likely infectious or inflammatory. 2. No CT evidence of active intraluminal gastrointestinal hemorrhage. 3. Trace bilateral pleural effusions. Bibasilar atelectasis. 4. Bone findings are compatible with the provided diagnosis of multiple myeloma.   5.5 x 2.6 x 3.2 cm soft tissue mass arises from the right fifth rib and extends  into the right pleural space. 5. Subcentimeter flash filling hemangioma is in hepatic segment 8.  6. 2.7 cm right ureterocele extends into the lumen of the urinary bladder. As  result, there is mild right hydroureter. No hydronephrosis. Other incidental  findings are described above. If comparison imaging becomes available, I can addend this report.        Assessment and Recommendations:   1. GI bleed  GI consulted  CT with evidence of multifocal colitis; infectious vs inflammatory  abx per primary team      2. Multiple Myeloma, refractory (dx 2002)  Being followed by Dr Juan Beckford at [facility]  Current treatment with combination Daratumumab and Pomalidomide po (1 mg). Received C#1 Daratumumab on July 6; plan was for weekly x 7 doses  Pomalidomide has not arrived; therefore has not been initiated    3. Anemia, normocytic  Secondary to MM  Agree with transfusion  Recommend continue supportive care; transfuse for Hgb < 7      4. Thrombocytopenia  Secondary to MM  Agree with tranfusion  Recommend continue supportive care; transfuse for plt < 50 with current GI bleed    The above exam and treatment plan were reviewed with Dr Sair Driscoll.     Signed By: Mandi Adam NP     July 11, 2017

## 2017-07-11 NOTE — PROGRESS NOTES
7/11/2017   CARE MANAGEMENT NOTE:  CM is following pt in the ER for initial discharge planning. EMR reviewed. CM met with pt and his wife at bedside to obtain hx for this needs assessment. Reportedly, pt resides with his wife in a one story home with ramp. Pt's son has been staying in the home and assisting pt with care. PTA, pt was non-ambulatory and he uses a w/c and standing device as well as a sliding board for transfers. He is able to self propel the w/c but he requires physical assistance with transfers and ADLs. Pt takes bed baths. There are no other caregivers in the home other than family. Pt uses [facility] pharmacy on [address]. He has had [facility] in the past but currently he is not receiving home health. DME in the home includes a manual w/c, standing device, BSC, and hospital bed. He goes to [facility] every Thurs. For immunotherapy. PCP - none. Dr. Jamia Freeman at [facility] is his oncologist.  Pace Moment is to return home.   Wife does not want home health unless there is a need for high level skilled nursing such as wound care etc.  Gael

## 2017-07-11 NOTE — ROUTINE PROCESS
1630:  [facility] SBAR, Kardex, Procedure Summary, Intake/Output, MAR, Recent Results and Cardiac Rhythm NSR reviewed. 1900  Bedside report given to Lieutenant Lal [facility]. SBAR, Kardex, Procedure Summary, Intake/Output and Cardiac Rhythm NSR reviewed. Patient is stable at this time. Call light within reach, bed alarm on, bed in low position.

## 2017-07-11 NOTE — IP AVS SNAPSHOT
Current Discharge Medication List  
  
START taking these medications Dose & Instructions Dispensing Information Comments Morning Noon Evening Bedtime  
 levoFLOXacin 750 mg tablet Commonly known as:  Chele Rodriguez Your last dose was: Your next dose is:    
   
   
 Dose:  750 mg Take 1 Tab by mouth daily for 5 days. Quantity:  5 Tab Refills:  0  
     
   
   
   
  
 metroNIDAZOLE 500 mg tablet Commonly known as:  FLAGYL Your last dose was: Your next dose is:    
   
   
 Dose:  500 mg Take 1 Tab by mouth three (3) times daily for 5 days. Quantity:  15 Tab Refills:  0 CONTINUE these medications which have NOT CHANGED Dose & Instructions Dispensing Information Comments Morning Noon Evening Bedtime  
 acyclovir 200 mg/5 mL suspension Commonly known as:  ZOVIRAX Your last dose was: Your next dose is:    
   
   
 Dose:  400 mg Take 400 mg by mouth every twelve (12) hours. Refills:  0  
     
   
   
   
  
 daratumumab 20 mg/mL injection Commonly known as:  Elton Saxena Your last dose was: Your next dose is:    
   
   
 by IntraVENous route Every Thursday. Immunotherapy administered at [facility] Refills:  0  
     
   
   
   
  
 dexamethasone 4 mg tablet Commonly known as:  DECADRON Your last dose was: Your next dose is:    
   
   
 Dose:  4 mg Take 4 mg by mouth Every Friday. The day after immunotherapy Refills:  0 LYRICA 50 mg capsule Generic drug:  pregabalin Your last dose was: Your next dose is:    
   
   
 Dose:  100 mg Take 100 mg by mouth daily. Refills:  0  
     
   
   
   
  
 midodrine 10 mg tablet Commonly known as:  Adama Loser Your last dose was: Your next dose is:    
   
   
 Dose:  10 mg Take 10 mg by mouth three (3) times daily (with meals). Refills:  0 montelukast 10 mg tablet Commonly known as:  SINGULAIR Your last dose was: Your next dose is:    
   
   
 Dose:  10 mg Take 10 mg by mouth two (2) days a week. The day before and day of immunotherapy. Based on current schedule, take on Thursday and Friday. Refills:  0  
     
   
   
   
  
 multivitamin tablet Commonly known as:  ONE A DAY Your last dose was: Your next dose is:    
   
   
 Dose:  1 Tab Take 1 Tab by mouth daily. Refills:  0  
     
   
   
   
  
 OLANZapine 5 mg tablet Commonly known as:  ZyPREXA Your last dose was: Your next dose is:    
   
   
 Dose:  5 mg Take 5 mg by mouth nightly as needed. Refills:  0 Omeprazole delayed release 20 mg tablet Commonly known as:  PRILOSEC D/R Your last dose was: Your next dose is:    
   
   
 Dose:  20 mg Take 20 mg by mouth two (2) times a day. Refills:  0 Where to Get Your Medications Information on where to get these meds will be given to you by the nurse or doctor. ! Ask your nurse or doctor about these medications  
  levoFLOXacin 750 mg tablet  
 metroNIDAZOLE 500 mg tablet

## 2017-07-11 NOTE — PROGRESS NOTES
TRANSFER - IN REPORT:    Verbal report received from Niru(name) on Tonie Reyes II  being received from ED(unit) for routine progression of care      Report consisted of patients Situation, Background, Assessment and   Recommendations(SBAR). Information from the following report(s) SBAR, Kardex, ED Summary, Procedure Summary, Intake/Output, MAR, Recent Results, Med Rec Status and Cardiac Rhythm ST was reviewed with the receiving nurse. Opportunity for questions and clarification was provided. Assessment completed upon patients arrival to unit and care assumed. Primary Nurse Donna Plaza RN and Oumar Duarte RN performed a dual skin assessment on this patient Impairment noted- see wound doc flow sheet. Jovon score is 14. Blood bank called to tell me that the platelets and PRBC's were going to take \"while\" because the patient has numerous antibodies. Also asked for 3 more pink top blood tubes to be sent. Completed and sent. 56) Daughter was at the bedside talking to her father for quite awhile after admission. Wife back in the room now. Upset that the blood has not been given yet. I explained that the blood was not ready yet and we discussed typing for blood products but I stated that I would continue to keep on top of getting the products he needs. I recalled the lab to see if they had any idea when the blood would be done. Ernst Capone from the lab stated that the [facility] was trying to get his blood typed and that they hoped it would be completed within 2 hours. Informed wife who is still upset but understands that we cannot control what happens at the [facility]. I promised that I would touch base frequently with the lab. [facility] has been called several times by [facility], inquiring about bed availability. 1230) Platelets have been received and given as ordered. Wife much happier at this time. Still concerned about getting the PRBC's but is calmer.    1400) Rechecked with the lab - blood still being typed at the [facility] - may be several more hours. Patient was started on Lenny for SBP less than 90. BP has improved - vitals flowsheet. Patient and his wife are still waiting for a return call from his oncologist at [facility] with their recommendations about having an endoscopy done. At this time, the patient is not wanting to have this done here and is not willing to start the bowel prep unless he has another bloody stool. 1600) Blood still not ready. Patient's BP better - SBP >95. Patient with a slight temp - up to 100.2 oral. Dr. Malia Freedman aware. No word from [facility] about a bed. Dr. Anyi Santana office notified that patient is not wanting to start the prep for the endoscopy.

## 2017-07-11 NOTE — PROGRESS NOTES
Occupational Therapy Note:  Orders received and appreciated. Chart reviewed. Pt admitted with GI bleed, currently with Hgb 6 and awaiting PRBCs. Will follow up with OT eval tomorrow as pt is able to actively participate. Thank you for the consult.   uBd Alvarenga, OTR/L, CBIS

## 2017-07-11 NOTE — CONSULTS
[facility]     Name: Alberto Quinonez MRN: [id number]   : 1955 Hospital: [address]   Date: 2017        Impression Plan   1. GI bleed  2. Hypotension  3. MM  4. Thrombocytopenia  5. Anemia  6. Diarrhea               · IVF  · Awaiting pRBCs- many antibodies  · Awaiting plts transfusion  · levoflox/flagyl  · Hematology consult pending  · Midodrine  · c-diff pending  · For now PPI- transition to famotidine as soon as possible       Pt is critically ill and at risk for hemodynamic decompensation. Critical care time spent with pt exclusive of procedures was 37 minutes    Radiology  ( personally reviewed) CT abdomen with discontinous colitis, rib findings consistent with MM and mild hydroureter, no hydronephrosis. ABG No results for input(s): PHI, PO2I, PCO2I in the last 72 hours. Subjective     Patient is a 57 y.o. male with PMHx of MM followed by Sandra Flores at [facility] presenting this morning after 3 episodes of gelantenous bloody stools at home. Pt currently undergoing chemo for MM. Wife reports that his last hemoglobin at [facility] was 7.2 and his last plt count was 7. He has had several hospitalizations in the last year at [facility] due to fevers (some while neutropenic, some when not). Pts normal BP runs in the low 90s. Pt currently with SBP in the 80s. Hgb was 6.0 on this admission. Seen by GI. Since pt an [facility] pt, family would like to have any GI workup done at [facility]. [facility] currently on diversion. Review of Systems:  A comprehensive review of systems was negative except for that written in the HPI. Past Medical History:   Diagnosis Date    Multiple myeloma ([facility] [address]       Past Surgical History:   Procedure Laterality Date    HX ORTHOPAEDIC      Rt hip, lt hip and femur, rt. humerus    HX ORTHOPAEDIC      titanium rods in back      Prior to Admission medications    Medication Sig Start Date End Date Taking?  Authorizing Provider   pregabalin (LYRICA) 50 mg capsule Take 100 mg by mouth daily. Yes Carolyn Espitia MD   midodrine (PROAMITINE) 10 mg tablet Take 10 mg by mouth three (3) times daily (with meals). Yes Carolyn Espitia MD   montelukast (SINGULAIR) 10 mg tablet Take 10 mg by mouth two (2) days a week. The day before and day of immunotherapy. Based on current schedule, take on Thursday and Friday. Yes Carolyn Espitia MD   Omeprazole delayed release (PRILOSEC D/R) 20 mg tablet Take 20 mg by mouth two (2) times a day. Yes Carolyn Espitia MD   acyclovir (ZOVIRAX) 200 mg/5 mL suspension Take 400 mg by mouth every twelve (12) hours. Yes Carolyn Espitia MD   dexamethasone (DECADRON) 4 mg tablet Take 4 mg by mouth Every Friday. The day after immunotherapy    Yes Historical Provider   OLANZapine (ZYPREXA) 5 mg tablet Take 5 mg by mouth nightly as needed. Yes Historical Provider   daratumumab (DARZALEX) 20 mg/mL injection by IntraVENous route Every Thursday. Immunotherapy administered at [facility]   Yes Historical Provider   multivitamin (ONE A DAY) tablet Take 1 Tab by mouth daily.    Yes Carolyn Espitia MD     Current Facility-Administered Medications   Medication Dose Route Frequency    sodium chloride (NS) flush 5-10 mL  5-10 mL IntraVENous Q8H    pantoprazole (PROTONIX) injection 40 mg  40 mg IntraVENous Q12H    acyclovir (ZOVIRAX) 200 mg/5 mL oral suspension 400 mg  400 mg Oral Q12H    midodrine (PROAMITINE) tablet 10 mg  10 mg Oral TID WITH MEALS    levoFLOXacin (LEVAQUIN) 750 mg in D5W IVPB  750 mg IntraVENous Q24H    metroNIDAZOLE (FLAGYL) IVPB premix 500 mg  500 mg IntraVENous Q8H    PHENYLephrine (NEOSYNEPHRINE) 30,000 mcg in 0.9% sodium chloride 250 mL infusion   mcg/min IntraVENous TITRATE    0.9% sodium chloride infusion 500 mL  500 mL IntraVENous ONCE     No Known Allergies   Social History   Substance Use Topics    Smoking status: Never Smoker    Smokeless tobacco: Never Used    Alcohol use Yes      Comment: socially, wine      Family History   Problem Relation Age of Onset    Diabetes Neg Hx           Laboratory: I have personally reviewed the critical care flowsheet and labs.      Recent Labs      07/11/17   0550   WBC  3.4*   HGB  6.0*   HCT  18.4*   PLT  15*     Recent Labs      07/11/17   0550   NA  137   K  3.4*   CL  103   CO2  25   GLU  114*   BUN  21*   CREA  1.11   CA  8.2*   ALB  2.2*   SGOT  31   ALT  25   INR  1.1       Objective:     Mode Rate Tidal Volume Pressure FiO2 PEEP                    Vital Signs:     TMAX(24)      Intake/Output:   Last shift:         Last 3 shifts:  [id number] intake or output data in the 24 hours ending 07/11/17 1131  EXAM:   GENERAL: well developed and in no distress, pale, thin HEENT:  PERRL, EOMI, no alar flaring or epistaxis, oral mucosa moist without cyanosis, NECK:  no jugular vein distention, no retractions, no thyromegaly or masses, LUNGS: CTA, no w/r/r HEART:  Regular rate and rhythm with no MGR; +1 edema is present in LE, ABDOMEN:  soft with no tenderness, bowel sounds present, EXTREMITIES:  warm with no cyanosis, SKIN:  no jaundice or ecchymosis and NEUROLOGIC:  alert and oriented, grossly non-focal    Min Fails, MD  [facility]

## 2017-07-11 NOTE — PROGRESS NOTES
GI       Full consult to follow     Recommend blood transfusion platelets plus PRBC. High dose pantoprazole.   Other recommendations pending CT angiogram.     At the moment wife but not patient are willing for an endoscopic evaluation depending on CT recommendations        Thanks

## 2017-07-11 NOTE — ROUTINE PROCESS
@6630 Bedside and Verbal shift change report given to Kevin Yen RN (oncoming nurse) by Deette Jeans RN (offgoing nurse). Report included the following information SBAR, Kardex, ED Summary, Procedure Summary, Intake/Output, MAR, Accordion, Recent Results, Med Rec Status, Cardiac Rhythm Sinus and Alarm Parameters . @0700 Bedside and Verbal shift change report given to Deette Jeans RN (oncoming nurse) by Kevin Yen RN (offgoing nurse). Report included the following information SBAR, Kardex, ED Summary, Procedure Summary, Intake/Output, MAR, Accordion, Recent Results, Med Rec Status, Cardiac Rhythm Sinus and Alarm Parameters .

## 2017-07-11 NOTE — CONSULTS
Gastroenterology Consult     Referring Physician: Carlos Stout    Consult Date: 7/11/2017     Subjective: Bleeding     Chief Complaint: Bleeding    History of Present Illness: Emily Reyes II is a 57 y.o. male who is seen in consultation for blood loss anemia. Personal history of multiple myeloma cared for at [facility]. Also history dysphagia, indigestion heartburn treated BID pantoprazole plus sucralfate with alleviation but not resolution of symptoms. Present with weakness. Repeated bowel movements with dark colored blood. Seen in ER when discussed differential diagnosis, potential blood and PLTs Tx, alternatives, complications. Also discussed possibility of continued blood loss which if not controlled could result death from blood loss. At the time patient stated his desire not to have endoscopic exams. Plan was then for Ct angiogram.  That exam does not disclose a bleed site.   RN staff ICU state patient is refusing any endoscopic exam at this moment    Past Medical History:   Diagnosis Date    Multiple myeloma ([address]      Past Surgical History:   Procedure Laterality Date    HX ORTHOPAEDIC      Rt hip, lt hip and femur, rt. humerus    HX ORTHOPAEDIC      titanium rods in back      Family History   Problem Relation Age of Onset    Diabetes Neg Hx      Social History   Substance Use Topics    Smoking status: Never Smoker    Smokeless tobacco: Never Used    Alcohol use Yes      Comment: socially, wine      No Known Allergies  Current Facility-Administered Medications   Medication Dose Route Frequency    0.9% sodium chloride infusion 250 mL  250 mL IntraVENous PRN    0.9% sodium chloride infusion 250 mL  250 mL IntraVENous PRN    0.9% sodium chloride infusion 250 mL  250 mL IntraVENous PRN    sodium chloride (NS) flush 5-10 mL  5-10 mL IntraVENous Q8H    sodium chloride (NS) flush 5-10 mL  5-10 mL IntraVENous PRN    acetaminophen (TYLENOL) tablet 650 mg  650 mg Oral Q4H PRN    morphine injection 1 mg 1 mg IntraVENous Q4H PRN    naloxone (NARCAN) injection 0.4 mg  0.4 mg IntraVENous PRN    diphenhydrAMINE (BENADRYL) injection 12.5 mg  12.5 mg IntraVENous Q4H PRN    prochlorperazine (COMPAZINE) injection 5 mg  5 mg IntraVENous Q8H PRN    pantoprazole (PROTONIX) injection 40 mg  40 mg IntraVENous Q12H    acyclovir (ZOVIRAX) 200 mg/5 mL oral suspension 400 mg  400 mg Oral Q12H    midodrine (PROAMITINE) tablet 10 mg  10 mg Oral TID WITH MEALS    levoFLOXacin (LEVAQUIN) 750 mg in D5W IVPB  750 mg IntraVENous Q24H    metroNIDAZOLE (FLAGYL) IVPB premix 500 mg  500 mg IntraVENous Q8H    PHENYLephrine (NEOSYNEPHRINE) 30,000 mcg in 0.9% sodium chloride 250 mL infusion   mcg/min IntraVENous TITRATE    lactulose (CHRONULAC) solution 30 g  45 mL Oral Q2H    magnesium citrate solution 296 mL  296 mL Oral BID    [START ON 7/12/2017] pregabalin (LYRICA) capsule 100 mg  100 mg Oral DAILY    0.9% sodium chloride infusion  50 mL/hr IntraVENous CONTINUOUS        Review of Systems:  A detailed 10 organ review of systems is obtained with pertinent positives as listed in the History of Present Illness and Past Medical History. All others are negative. Objective:     Physical Exam:  Visit Vitals    /50 (BP 1 Location: Left arm, BP Patient Position: Lying left side)    Pulse 87    Temp 100.2 °F (37.9 °C)    Resp 22    Ht 5' 5\" (1.651 m)    Wt 57 kg (125 lb 10.6 oz)    SpO2 100%    BMI 20.91 kg/m2        Skin:  Extremities and face reveal no rashes. No richards erythema. No telangiectasias on the chest wall; multiple petechial bleeds. HEENT: Sclerae anicteric. Extra-occular muscles are intact. No oral ulcers. No abnormal pigmentation of the lips. The neck is supple. Cardiovascular: Regular rate and rhythm. No murmurs, gallops, or rubs. Respiratory:  Comfortable breathing with no accessory muscle use. Clear breath sounds with no wheezes, rales, or rhonchi.   GI:  Abdomen nondistended, soft, and nontender. Normal active bowel sounds. No enlargement of the liver or spleen. No masses palpable. Musculoskeletal:  No pitting edema of the lower legs. Extremities have good range of motion. Neurological:  Gross memory appears intact. Patient is alert and oriented. Psychiatric:  Mood appears appropriate with judgement intact. Lymphatic:  No cervical or supraclavicular adenopathy.     Lab/Data Review:  CMP:   Lab Results   Component Value Date/Time     07/11/2017 05:50 AM    K 3.4 (L) 07/11/2017 05:50 AM     07/11/2017 05:50 AM    CO2 25 07/11/2017 05:50 AM    AGAP 9 07/11/2017 05:50 AM     (H) 07/11/2017 05:50 AM    BUN 21 (H) 07/11/2017 05:50 AM    CREA 1.11 07/11/2017 05:50 AM    GFRAA >60 07/11/2017 05:50 AM    GFRNA >60 07/11/2017 05:50 AM    CA 8.2 (L) 07/11/2017 05:50 AM    ALB 2.2 (L) 07/11/2017 05:50 AM    TP 4.9 (L) 07/11/2017 05:50 AM    GLOB 2.7 07/11/2017 05:50 AM    AGRAT 0.8 (L) 07/11/2017 05:50 AM    SGOT 31 07/11/2017 05:50 AM    ALT 25 07/11/2017 05:50 AM     CBC:   Lab Results   Component Value Date/Time    WBC 3.4 (L) 07/11/2017 05:50 AM    HGB 6.0 (L) 07/11/2017 05:50 AM    HCT 18.4 (L) 07/11/2017 05:50 AM    PLT 15 (LL) 07/11/2017 05:50 AM     COAGS:   Lab Results   Component Value Date/Time    PTP 10.7 07/11/2017 05:50 AM    INR 1.1 07/11/2017 05:50 AM         Assessment/Plan:     Principal Problem:    Acute GI bleeding (7/11/2017)    Active Problems:    Anemia associated with acute blood loss (7/11/2017)      Thrombocytopenia ([address] (7/11/2017)      Hypokalemia (7/11/2017)      Pancytopenia due to chemotherapy ([address] (7/11/2017)      Multiple myeloma ([facility] [address] (7/11/2017)      Diarrhea (7/11/2017)      Hypotension, chronic (7/11/2017)         Recommend:  High dose pantoprazole  AM labs  Additional blood transfusions as needed  Thanks

## 2017-07-11 NOTE — PROGRESS NOTES
Shift Summary    1700:  Patient resting quietly in bed. Incontinence episode cleaned up. No complaints of pain at this time. Lenny at 70 into right port. Wife at the bedside. Wife and patient informed that we still do not have blood but that the [place] is still looking. Will keep them posted. 1830:  Patient sleeping, wife at the bedside. No needs voiced.

## 2017-07-11 NOTE — ED PROVIDER NOTES
Patient is a 57 y.o. male presenting with melena. Melena    Associated symptoms include a fever (not above 100.4), abdominal pain and diarrhea. Pertinent negatives include no chest pain, no coughing and no rash. 57year old male with multiple myeloma, managed at [facility], last chemo week ago Thursday, seen in cancer clinic yesterday with platelets of 7, transfused 1 bag of platelets. Hemoglobin 7. This morning woke up at 4am to use restroom and has had 3 large bloody bm's since then. Denies lightheaded/syncope. Denies chest pain/SOB. Had some cramping pain initially but none now. No history of GI bleed in past. Is on prilosec. Normal BP for him is low 90's. Past Medical History:   Diagnosis Date    Multiple myeloma ([facility] [address]        Past Surgical History:   Procedure Laterality Date    HX ORTHOPAEDIC      R hip    HX ORTHOPAEDIC      titanium rods in back         No family history on file. Social History     Social History    Marital status:      Spouse name: N/A    Number of children: N/A    Years of education: N/A     Occupational History    Not on file. Social History Main Topics    Smoking status: Never Smoker    Smokeless tobacco: Not on file    Alcohol use Yes      Comment: socially    Drug use: No    Sexual activity: Not on file     Other Topics Concern    Not on file     Social History Narrative    No narrative on file         ALLERGIES: Review of patient's allergies indicates no known allergies. Review of Systems   Constitutional: Positive for fever (not above 100.4). HENT: Negative for congestion. Eyes: Negative for visual disturbance. Respiratory: Negative for cough and shortness of breath. Cardiovascular: Negative for chest pain. Gastrointestinal: Positive for abdominal pain, blood in stool, diarrhea and melena. Endocrine: Negative for polyuria. Genitourinary: Negative for dysuria.    Musculoskeletal: Positive for gait problem (doesnt ambulate due to heallng left femur fracture). Skin: Negative for rash. Neurological: Negative for dizziness and light-headedness. Vitals:    07/11/17 0548   BP: (!) 64/29   Pulse: (!) 117   Resp: 16   Temp: 99.1 °F (37.3 °C)   SpO2: 99%   Weight: 56.2 kg (124 lb)   Height: 5' 5\" (1.651 m)            Physical Exam   Constitutional: He is oriented to person, place, and time. He appears well-developed and well-nourished. No distress. Pale and cachectic   HENT:   Head: Normocephalic and atraumatic. Mouth/Throat: Oropharynx is clear and moist. No oropharyngeal exudate. Eyes: Conjunctivae and EOM are normal. Pupils are equal, round, and reactive to light. Right eye exhibits no discharge. Left eye exhibits no discharge. No scleral icterus. Neck: Normal range of motion. Neck supple. No JVD present. Cardiovascular: Normal rate, regular rhythm, normal heart sounds and intact distal pulses. Exam reveals no gallop and no friction rub. No murmur heard. Pulmonary/Chest: Effort normal and breath sounds normal. No stridor. No respiratory distress. He has no wheezes. He has no rales. He exhibits no tenderness. Port in right anterior chest   Abdominal: Soft. Bowel sounds are normal. He exhibits no distension and no mass. There is no tenderness. There is no rebound and no guarding. Genitourinary:   Genitourinary Comments: Diaper full of bloody stool   Musculoskeletal: Normal range of motion. He exhibits no edema or tenderness. Neurological: He is alert and oriented to person, place, and time. He has normal reflexes. No cranial nerve deficit. He exhibits normal muscle tone. Coordination normal.   Skin: Skin is warm and dry. No rash noted. No erythema. Psychiatric: He has a normal mood and affect.  His behavior is normal. Judgment and thought content normal.        MDM  Number of Diagnoses or Management Options  Anemia, unspecified type:   Gastrointestinal hemorrhage, unspecified gastrointestinal hemorrhage type: Thrombocytopenia [facility]):   Critical Care  Total time providing critical care: 30-74 minutes  60 minutes  ED Course       Procedures    Type and cross for pRBC and platelets. Fluids. Consult to [facility] oncology for more patient information       ED EKG interpretation:  Rhythm: sinus tachycardia; and regular . Rate (approx.): 114; Axis: normal; P wave: normal; QRS interval: normal ; ST/T wave: non-specific changes;. This EKG was interpreted by Jayme Travis MD,ED Provider. Dr. Shaye Alfaro, oncology at [facility]- received one dose of platelets yesterday, platelets likely only up to 18K after so would go ahead and transfuse now. Goal >50. [facility] not accepting patients. Will consult GI early for their involvement. CTA abdomen/pelvis ordered. Dr. Rashmi Loco in ED to see and evaluate. Platelets 36O, hemoglobin 6. Spoke with hospitalist who will admit.

## 2017-07-12 PROBLEM — K52.9 ACUTE COLITIS: Status: ACTIVE | Noted: 2017-07-12

## 2017-07-12 LAB
ALBUMIN SERPL BCP-MCNC: 2 G/DL (ref 3.5–5)
ALBUMIN/GLOB SERPL: 0.7 {RATIO} (ref 1.1–2.2)
ALP SERPL-CCNC: 92 U/L (ref 45–117)
ALT SERPL-CCNC: 23 U/L (ref 12–78)
ANION GAP BLD CALC-SCNC: 6 MMOL/L (ref 5–15)
AST SERPL W P-5'-P-CCNC: 34 U/L (ref 15–37)
BACTERIA SPEC CULT: NORMAL
BACTERIA SPEC CULT: NORMAL
BASOPHILS # BLD AUTO: 0 K/UL (ref 0–0.1)
BASOPHILS # BLD: 0 % (ref 0–1)
BILIRUB SERPL-MCNC: 0.7 MG/DL (ref 0.2–1)
BLD PROD TYP BPU: NORMAL
BPU ID: NORMAL
BUN SERPL-MCNC: 14 MG/DL (ref 6–20)
BUN/CREAT SERPL: 14 (ref 12–20)
CALCIUM SERPL-MCNC: 7.6 MG/DL (ref 8.5–10.1)
CHLORIDE SERPL-SCNC: 105 MMOL/L (ref 97–108)
CO2 SERPL-SCNC: 26 MMOL/L (ref 21–32)
CREAT SERPL-MCNC: 0.98 MG/DL (ref 0.7–1.3)
DIFFERENTIAL METHOD BLD: ABNORMAL
EOSINOPHIL # BLD: 0 K/UL (ref 0–0.4)
EOSINOPHIL NFR BLD: 0 % (ref 0–7)
ERYTHROCYTE [DISTWIDTH] IN BLOOD BY AUTOMATED COUNT: 17.4 % (ref 11.5–14.5)
GLOBULIN SER CALC-MCNC: 2.7 G/DL (ref 2–4)
GLUCOSE SERPL-MCNC: 92 MG/DL (ref 65–100)
HCT VFR BLD AUTO: 22.1 % (ref 36.6–50.3)
HGB BLD-MCNC: 7.7 G/DL (ref 12.1–17)
LYMPHOCYTES # BLD AUTO: 32 % (ref 12–49)
LYMPHOCYTES # BLD: 0.9 K/UL (ref 0.8–3.5)
MCH RBC QN AUTO: 30.9 PG (ref 26–34)
MCHC RBC AUTO-ENTMCNC: 34.8 G/DL (ref 30–36.5)
MCV RBC AUTO: 88.8 FL (ref 80–99)
MONOCYTES # BLD: 0.2 K/UL (ref 0–1)
MONOCYTES NFR BLD AUTO: 8 % (ref 5–13)
NEUTS SEG # BLD: 1.7 K/UL (ref 1.8–8)
NEUTS SEG NFR BLD AUTO: 60 % (ref 32–75)
PLATELET # BLD AUTO: 13 K/UL (ref 150–400)
POTASSIUM SERPL-SCNC: 3.3 MMOL/L (ref 3.5–5.1)
PROT SERPL-MCNC: 4.7 G/DL (ref 6.4–8.2)
RBC # BLD AUTO: 2.49 M/UL (ref 4.1–5.7)
RBC MORPH BLD: ABNORMAL
SERVICE CMNT-IMP: NORMAL
SODIUM SERPL-SCNC: 137 MMOL/L (ref 136–145)
STATUS OF UNIT,%ST: NORMAL
UNIT DIVISION, %UDIV: 0
WBC # BLD AUTO: 2.8 K/UL (ref 4.1–11.1)

## 2017-07-12 PROCEDURE — 87045 FECES CULTURE AEROBIC BACT: CPT | Performed by: INTERNAL MEDICINE

## 2017-07-12 PROCEDURE — C9113 INJ PANTOPRAZOLE SODIUM, VIA: HCPCS | Performed by: INTERNAL MEDICINE

## 2017-07-12 PROCEDURE — 77030011256 HC DRSG MEPILEX <16IN NO BORD MOLN -A

## 2017-07-12 PROCEDURE — 74011250636 HC RX REV CODE- 250/636: Performed by: INTERNAL MEDICINE

## 2017-07-12 PROCEDURE — 80053 COMPREHEN METABOLIC PANEL: CPT | Performed by: INTERNAL MEDICINE

## 2017-07-12 PROCEDURE — 74011250637 HC RX REV CODE- 250/637: Performed by: INTERNAL MEDICINE

## 2017-07-12 PROCEDURE — 74011000250 HC RX REV CODE- 250: Performed by: INTERNAL MEDICINE

## 2017-07-12 PROCEDURE — 36430 TRANSFUSION BLD/BLD COMPNT: CPT

## 2017-07-12 PROCEDURE — 97530 THERAPEUTIC ACTIVITIES: CPT

## 2017-07-12 PROCEDURE — 97166 OT EVAL MOD COMPLEX 45 MIN: CPT

## 2017-07-12 PROCEDURE — P9037 PLATE PHERES LEUKOREDU IRRAD: HCPCS | Performed by: NURSE PRACTITIONER

## 2017-07-12 PROCEDURE — 85025 COMPLETE CBC W/AUTO DIFF WBC: CPT | Performed by: INTERNAL MEDICINE

## 2017-07-12 PROCEDURE — 36415 COLL VENOUS BLD VENIPUNCTURE: CPT | Performed by: INTERNAL MEDICINE

## 2017-07-12 PROCEDURE — 65270000029 HC RM PRIVATE

## 2017-07-12 PROCEDURE — 97535 SELF CARE MNGMENT TRAINING: CPT

## 2017-07-12 RX ORDER — POTASSIUM CHLORIDE 750 MG/1
40 TABLET, FILM COATED, EXTENDED RELEASE ORAL
Status: COMPLETED | OUTPATIENT
Start: 2017-07-12 | End: 2017-07-12

## 2017-07-12 RX ORDER — SODIUM CHLORIDE 9 MG/ML
250 INJECTION, SOLUTION INTRAVENOUS AS NEEDED
Status: DISCONTINUED | OUTPATIENT
Start: 2017-07-12 | End: 2017-07-13 | Stop reason: HOSPADM

## 2017-07-12 RX ORDER — SODIUM CHLORIDE 9 MG/ML
500 INJECTION, SOLUTION INTRAVENOUS ONCE
Status: COMPLETED | OUTPATIENT
Start: 2017-07-12 | End: 2017-07-12

## 2017-07-12 RX ADMIN — LEVOFLOXACIN 750 MG: 5 INJECTION, SOLUTION INTRAVENOUS at 12:28

## 2017-07-12 RX ADMIN — POTASSIUM CHLORIDE 40 MEQ: 750 TABLET, FILM COATED, EXTENDED RELEASE ORAL at 09:07

## 2017-07-12 RX ADMIN — ACYCLOVIR 400 MG: 200 SUSPENSION ORAL at 09:07

## 2017-07-12 RX ADMIN — METRONIDAZOLE 500 MG: 500 INJECTION, SOLUTION INTRAVENOUS at 20:19

## 2017-07-12 RX ADMIN — METRONIDAZOLE 500 MG: 500 INJECTION, SOLUTION INTRAVENOUS at 03:34

## 2017-07-12 RX ADMIN — MIDODRINE HYDROCHLORIDE 10 MG: 5 TABLET ORAL at 12:28

## 2017-07-12 RX ADMIN — ACYCLOVIR 400 MG: 200 SUSPENSION ORAL at 20:19

## 2017-07-12 RX ADMIN — Medication 10 ML: at 05:15

## 2017-07-12 RX ADMIN — PREGABALIN 100 MG: 50 CAPSULE ORAL at 08:37

## 2017-07-12 RX ADMIN — SODIUM CHLORIDE 500 ML: 900 INJECTION, SOLUTION INTRAVENOUS at 09:08

## 2017-07-12 RX ADMIN — SODIUM CHLORIDE 50 ML/HR: 900 INJECTION, SOLUTION INTRAVENOUS at 18:42

## 2017-07-12 RX ADMIN — Medication 10 ML: at 21:13

## 2017-07-12 RX ADMIN — Medication 10 ML: at 12:28

## 2017-07-12 RX ADMIN — METRONIDAZOLE 500 MG: 500 INJECTION, SOLUTION INTRAVENOUS at 12:28

## 2017-07-12 RX ADMIN — PHENYLEPHRINE HYDROCHLORIDE 20 MCG/MIN: 10 INJECTION INTRAVENOUS at 10:50

## 2017-07-12 RX ADMIN — PANTOPRAZOLE SODIUM 40 MG: 40 INJECTION, POWDER, FOR SOLUTION INTRAVENOUS at 20:19

## 2017-07-12 RX ADMIN — MIDODRINE HYDROCHLORIDE 10 MG: 5 TABLET ORAL at 17:42

## 2017-07-12 RX ADMIN — MIDODRINE HYDROCHLORIDE 10 MG: 5 TABLET ORAL at 08:37

## 2017-07-12 RX ADMIN — PANTOPRAZOLE SODIUM 40 MG: 40 INJECTION, POWDER, FOR SOLUTION INTRAVENOUS at 08:37

## 2017-07-12 NOTE — PROGRESS NOTES
@8203 1st unit of PRBCs started. @6350 Lenny wean to 60 mcg/min. @2200 Lenny wean to 40 mcg/min. @2355 2nd unit of PRBCs started. @0330 Wean Lenny to 20 mcg/min.    @0530 Notified Dr. [place] of [id number] no new orders received. @0600 Lenny titrated to 30 mcg/min.

## 2017-07-12 NOTE — PROGRESS NOTES
Gastrointestinal Progress Note    7/12/2017    Admit Date: 7/11/2017    Subjective:     New Complaints Today: No - continues to refuse endoscopic testing here    Pain: Patient complains of abdominal pain no. Tolerating clear liquids. Bowel Movements: most recent BM light brown per RN    Bleeding:  None    Current Facility-Administered Medications   Medication Dose Route Frequency    0.9% sodium chloride infusion 250 mL  250 mL IntraVENous PRN    0.9% sodium chloride infusion 250 mL  250 mL IntraVENous PRN    0.9% sodium chloride infusion 250 mL  250 mL IntraVENous PRN    0.9% sodium chloride infusion 250 mL  250 mL IntraVENous PRN    sodium chloride (NS) flush 5-10 mL  5-10 mL IntraVENous Q8H    sodium chloride (NS) flush 5-10 mL  5-10 mL IntraVENous PRN    acetaminophen (TYLENOL) tablet 650 mg  650 mg Oral Q4H PRN    morphine injection 1 mg  1 mg IntraVENous Q4H PRN    naloxone (NARCAN) injection 0.4 mg  0.4 mg IntraVENous PRN    diphenhydrAMINE (BENADRYL) injection 12.5 mg  12.5 mg IntraVENous Q4H PRN    prochlorperazine (COMPAZINE) injection 5 mg  5 mg IntraVENous Q8H PRN    pantoprazole (PROTONIX) injection 40 mg  40 mg IntraVENous Q12H    acyclovir (ZOVIRAX) 200 mg/5 mL oral suspension 400 mg  400 mg Oral Q12H    midodrine (PROAMITINE) tablet 10 mg  10 mg Oral TID WITH MEALS    levoFLOXacin (LEVAQUIN) 750 mg in D5W IVPB  750 mg IntraVENous Q24H    metroNIDAZOLE (FLAGYL) IVPB premix 500 mg  500 mg IntraVENous Q8H    PHENYLephrine (NEOSYNEPHRINE) 30,000 mcg in 0.9% sodium chloride 250 mL infusion   mcg/min IntraVENous TITRATE    magnesium citrate solution 296 mL  296 mL Oral BID    pregabalin (LYRICA) capsule 100 mg  100 mg Oral DAILY    0.9% sodium chloride infusion  50 mL/hr IntraVENous CONTINUOUS        Objective:     Blood pressure 115/57, pulse 68, temperature 98.3 °F (36.8 °C), resp.  rate 18, height 5' 5\" (1.651 m), weight 57 kg (125 lb 10.6 oz), SpO2 100 %.         07/10 1901 - 07/12 0700  In: 2533.7 [I.V.:1632.3]  Out: 2125 [Urine:2125]    EXAM:   GENERAL: alert, cooperative, no distress,   HEART: regular rate and rhythm,   LUNGS: clear to auscultation, no respiratory distress   ABDOMEN: soft, nontender    Data Review    Recent Results (from the past 24 hour(s))   CULTURE, MRSA    Collection Time: 07/11/17  9:54 AM   Result Value Ref Range    Special Requests: NO SPECIAL REQUESTS      Culture result: MRSA NOT PRESENT AT 18 HOURS     METABOLIC PANEL, COMPREHENSIVE    Collection Time: 07/12/17  4:24 AM   Result Value Ref Range    Sodium 137 136 - 145 mmol/L    Potassium 3.3 (L) 3.5 - 5.1 mmol/L    Chloride 105 97 - 108 mmol/L    CO2 26 21 - 32 mmol/L    Anion gap 6 5 - 15 mmol/L    Glucose 92 65 - 100 mg/dL    BUN 14 6 - 20 MG/DL    Creatinine 0.98 0.70 - 1.30 MG/DL    BUN/Creatinine ratio 14 12 - 20      GFR est AA >60 >60 ml/min/1.73m2    GFR est non-AA >60 >60 ml/min/1.73m2    Calcium 7.6 (L) 8.5 - 10.1 MG/DL    Bilirubin, total 0.7 0.2 - 1.0 MG/DL    ALT (SGPT) 23 12 - 78 U/L    AST (SGOT) 34 15 - 37 U/L    Alk. phosphatase 92 45 - 117 U/L    Protein, total 4.7 (L) 6.4 - 8.2 g/dL    Albumin 2.0 (L) 3.5 - 5.0 g/dL    Globulin 2.7 2.0 - 4.0 g/dL    A-G Ratio 0.7 (L) 1.1 - 2.2     CBC WITH AUTOMATED DIFF    Collection Time: 07/12/17  4:24 AM   Result Value Ref Range    WBC 2.8 (L) 4.1 - 11.1 K/uL    RBC 2.49 (L) 4.10 - 5.70 M/uL    HGB 7.7 (L) 12.1 - 17.0 g/dL    HCT 22.1 (L) 36.6 - 50.3 %    MCV 88.8 80.0 - 99.0 FL    MCH 30.9 26.0 - 34.0 PG    MCHC 34.8 30.0 - 36.5 g/dL    RDW 17.4 (H) 11.5 - 14.5 %    PLATELET 13 (LL) 291 - 400 K/uL    NEUTROPHILS 60 32 - 75 %    LYMPHOCYTES 32 12 - 49 %    MONOCYTES 8 5 - 13 %    EOSINOPHILS 0 0 - 7 %    BASOPHILS 0 0 - 1 %    ABS. NEUTROPHILS 1.7 (L) 1.8 - 8.0 K/UL    ABS. LYMPHOCYTES 0.9 0.8 - 3.5 K/UL    ABS. MONOCYTES 0.2 0.0 - 1.0 K/UL    ABS. EOSINOPHILS 0.0 0.0 - 0.4 K/UL    ABS.  BASOPHILS 0.0 0.0 - 0.1 K/UL    DF SMEAR SCANNED      RBC COMMENTS ANISOCYTOSIS  1+           Assessment:     Principal Problem:    Acute GI bleeding (7/11/2017)    Active Problems:    Anemia associated with acute blood loss (7/11/2017)      Thrombocytopenia (HCC) (7/11/2017)      Hypokalemia (7/11/2017)      Pancytopenia due to chemotherapy ([facility] [address] (7/11/2017)      Multiple myeloma ([facility] [address] (7/11/2017)      Diarrhea (7/11/2017)      Hypotension, chronic (7/11/2017)        Plan:     Patient continues to refuse further GI workup here. Does not want endoscopic evaluation. Recommend to continue with acid suppression medication. Patient was seen with Dr. Mami Ty and again reiterated that since declining workup cannot state what caused bleeding and there is risk/possiblity for ongoing blood loss that could lead to demise. He voices understanding. Continue with blood transfusions (PRBC, plt) as needed. Transfer/discharge per attending. Leonardo Jenkins PA-C  07/12/17  9:38 AM    In the absence of renewed overt bleed, no new recommendation. Please ask for return visit if additional bleed.   Will see again at your request.  Thanks    I have interviewed and examined patient with addendum to note above and formulation care plan    Gale Delatorre M.D.

## 2017-07-12 NOTE — PROGRESS NOTES
Physical Therapy:  Orders received and chart reviewed. Patient currently receiving blood transfusion and platelet transfusion. We will continue to follow and re-attempt as able.   Thank you  Jakob Lowe PT,DPT,NCS

## 2017-07-12 NOTE — PROGRESS NOTES
Occupational Therapy EVALUATION/discharge  Patient: Janet Piper II (1 y.o. male)  Date: 7/12/2017  Primary Diagnosis: Acute GI bleeding  GI BLEED  Procedure(s) (LRB):  ESOPHAGOGASTRODUODENOSCOPY (EGD) (N/A)     Precautions:   Fall, DNR (per pt wife BLEs WBAT for amb <5 feet)    ASSESSMENT:   Based on the objective data described below, the patient presents with diminished endurance, strength and balance following admission for acute GI bleed. Cleared by RN to see pt, who is on chevy drip. At baseline pt has a L femur fracture since February this year and uses a w/c for functional mobility (WBAT for amb <5 feet). Pt lives with his wife who assists with his ADLs and uses a sit>stand lift to assist him with transfers. Pt required max A for supine>sit transfer (max A x2 for sit>supine). Demonstrated ROM and strength in bilateral UEs WFL. Pt's BP 89/45 initially in supine, stepna to 101/55 by end of session after light activity and sitting EOB. Pt requires total A for all LB ADLs, and is not able to stand at this time due to BLE weakness and neuropathy and the L femur fracture. Educated pt and his wife on the use of tub transfer bench for showering, as he and his wife reported that he has been taking bed baths since February and would like to utilize the shower. He and his wife report that he is performing near his baseline, and they have been managing his care at home. They expressed no concerns for going home or any further equipment needs. Pt and his wife verbalized good understanding of energy conservation techniques, exercises, and functional transfer techniques. Further skilled acute occupational therapy is not indicated at this time.   Discharge Recommendations: None  Further Equipment Recommendations for Discharge: none      SUBJECTIVE:   Patient stated This is about how I normally am.    OBJECTIVE DATA SUMMARY:   HISTORY:   Past Medical History:   Diagnosis Date    Multiple myeloma [facility])      Past Surgical History:   Procedure Laterality Date    HX ORTHOPAEDIC      Rt hip, lt hip and femur, rt. humerus    HX ORTHOPAEDIC      titanium rods in back       Prior Level of Function/Home Situation: Pt has A from wife for functional transfers and ADLs. They uses a w/c for functional mobility and sliding board to assist with transfers, and they also have a sit>stand lift to assist with transfers. Pt is dependent for all LB ADLs, but is able to perform UB dressing and grooming in seated. Expanded or extensive additional review of patient history: Thorough chart review and history obtained from nurse, ICU rounds, pt and wife. Home Situation  Home Environment: Private residence  # Steps to Enter: 0  Wheelchair Ramp: Yes  One/Two Story Residence: One story  Living Alone: No  Support Systems: Child(mahnaz), Family member(s), Friends \ neighbors, Spouse/Significant Other/Partner  Patient Expects to be Discharged to[de-identified] Private residence  Current DME Used/Available at [facility]: Wheelchair, Walker, rolling, Commode, bedside, Hospital bed (lizz steady sit to stand, sliding board)  Tub or Shower Type: Shower (but pt has sponge bathed since Feb. 2017)  [x]  Right hand dominant   []  Left hand dominant    EXAMINATION OF PERFORMANCE DEFICITS:  Cognitive/Behavioral Status:  Neurologic State: Alert  Orientation Level: Oriented X4  Cognition: Appropriate safety awareness; Appropriate decision making; Follows commands  Perception: Appears intact  Perseveration: No perseveration noted  Safety/Judgement: Awareness of environment;Home safety    Hearing:   Auditory  Auditory Impairment: None    Vision/Perceptual:             Acuity: Able to read clock/calendar on wall without difficulty         Range of Motion:    AROM: Generally decreased, functional  PROM: Generally decreased, functional     Strength:    Strength: Generally decreased, functional     Coordination:  Coordination: Generally decreased, functional  Fine Motor Skills-Upper: Left Intact; Right Intact    Gross Motor Skills-Upper: Left Intact; Right Intact    Tone & Sensation:    Tone: Normal  Sensation: Intact        Balance:  Sitting: With support; Intact    Functional Mobility and Transfers for ADLs:  Bed Mobility:  Supine to Sit: Maximum assistance  Sit to Supine: Maximum assistance;Assist x2   Scooting: Moderate assistance    Transfers:       ADL Assessment:  Feeding: Setup    Oral Facial Hygiene/Grooming: Setup    Bathing: Maximum assistance    Upper Body Dressing: Minimum assistance    Lower Body Dressing: Total assistance    Toileting: Total assistance       ADL Intervention and task modifications:   Pt is total A for LB ADLs, donned pt's socks prior to transferring supine>sit. Lower Body Dressing Assistance  Dressing Assistance: Total assistance(dependent)  Socks: Total assistance (dependent)         Cognitive Retraining  Safety/Judgement: Awareness of environment;Home safety    Functional Measure:  Barthel Index:    Bathin  Bladder: 10  Bowels: 10  Groomin  Dressin  Feedin  Mobility: 0  Stairs: 0  Toilet Use: 0  Transfer (Bed to Chair and Back): 5  Total: 30       Barthel and G-code impairment scale:  Percentage of impairment CH  0% CI  1-19% CJ  20-39% CK  40-59% CL  60-79% CM  80-99% CN  100%   Barthel Score 0-100 100 99-80 79-60 59-40 20-39 1-19   0   Barthel Score 0-20 20 17-19 13-16 9-12 5-8 1-4 0      The Barthel ADL Index: Guidelines  1. The index should be used as a record of what a patient does, not as a record of what a patient could do. 2. The main aim is to establish degree of independence from any help, physical or verbal, however minor and for whatever reason. 3. The need for supervision renders the patient not independent. 4. A patient's performance should be established using the best available evidence. Asking the patient, friends/relatives and nurses are the usual sources, but direct observation and common sense are also important.  However direct testing is not needed. 5. Usually the patient's performance over the preceding 24-48 hours is important, but occasionally longer periods will be relevant. 6. Middle categories imply that the patient supplies over 50 per cent of the effort. 7. Use of aids to be independent is allowed. Cayetano Jane., Barthel, D.W. (7498). Functional evaluation: the Barthel Index. [facility] (14)2. NEERAJ Mina Silver Ply., Noe Mosley., [place], [address] [place] [address] (1999). Measuring the change indisability after inpatient rehabilitation; comparison of the responsiveness of the Barthel Index and Functional Mercer Measure. Journal of Neurology, Neurosurgery, and Psychiatry, 66(4), 444-580. Poly Arauz, N.J.A, DIA Brown, & Dalia Bashir MCAPRI. (2004.) Assessment of post-stroke quality of life in cost-effectiveness studies: The usefulness of the Barthel Index and the EuroQoL-5D. Quality of Life Research, 13, 317-96       G codes: In compliance with CMSs Claims Based Outcome Reporting, the following G-code set was chosen for this patient based on their primary functional limitation being treated: The outcome measure chosen to determine the severity of the functional limitation was the Barthel Index with a score of 30/100 which was correlated with the impairment scale. ?  Self Care:     - CURRENT STATUS: CL - 60%-79% impaired, limited or restricted    - GOAL STATUS: CL - 60%-79% impaired, limited or restricted    - D/C STATUS:  CL - 60%-79% impaired, limited or restricted       Occupational Therapy Evaluation Charge Determination   History Examination Decision-Making   MEDIUM Complexity : Expanded review of history including physical, cognitive and psychosocial  history  MEDIUM Complexity : 3-5 performance deficits relating to physical, cognitive , or psychosocial skils that result in activity limitations and / or participation restrictions MEDIUM Complexity : Patient may present with comorbidities that affect occupational performnce. Miniml to moderate modification of tasks or assistance (eg, physical or verbal ) with assesment(s) is necessary to enable patient to complete evaluation       Based on the above components, the patient evaluation is determined to be of the following complexity level: MEDIUM  Pain:Pain Scale 1: Numeric (0 - 10)  Pain Intensity 1: 0              Activity Tolerance:   Fair  Please refer to the flowsheet for vital signs taken during this treatment. After treatment:   []  Patient left in no apparent distress sitting up in chair  [x]  Patient left in no apparent distress in bed- bed in chair position  [x]  Call bell left within reach  [x]  Nursing notified  [x]  Caregiver present - wife  []  Bed alarm activated    COMMUNICATION/EDUCATION:   Communication/Collaboration:  [x]      Home safety education was provided and the patient/caregiver indicated understanding. [x]      Patient/family have participated as able and agree with findings and recommendations. []      Patient is unable to participate in plan of care at this time.   Findings and recommendations were discussed with: Physical Therapist and Registered Nurse    Alexandra Alfonso  Time Calculation: 36 mins

## 2017-07-12 NOTE — PROGRESS NOTES
I contacted the U transfer center. [facility] is off of diversion. I am contacting Dr Mayco Eastman as [facility] requires a physician to physician before pt can be accepted and assigned a bed. The number for Dr Mayco Eastman to call is [phone] and the transfer center will connect him with the physician. I refaxed the demographic sheet to [facility] Transfer center./  Luly Eastman will assess pt.and then contact [facility]. Luly Frias

## 2017-07-12 NOTE — PROGRESS NOTES
Pt.'s appointment with [facility] oncology has been changed to Friday. The plan for now is for pt to be discharged home in the morning.   Manju Reddy

## 2017-07-12 NOTE — PROGRESS NOTES
Nutrition Assessment:    RECOMMENDATIONS/INTERVENTION(S):   Further diet advancement per GI  Add trial of Ensure Compact and Mighty Shake both once daily  MVI with Minerals daily if not already ordered  Encourage meals/fluids  Will continue to monitor appetite/PO intake, diet tolerance, and acceptance of oral supplements    ASSESSMENT:   Received MST referral for weight loss. Chart reviewed. 56 yo male admitted with GI Bleed, thrombocytopenia. Hx multiple myeloma and chemotherapy. Pt refusing endoscopy here. Visited pt this morning. Reports decreased appetite x several weeks prior to admission along with significant weight loss. Wife reports pt takes organic protein powder mixed in foods on a daily basis, occasionally drinks Ensure or Boost supplements but not consistently. Agreeable to try Ensure Compact and Mighty Shake while here in hospital.   Labs/Meds reviewed. IVF infusing. On Lenny. K+ repleted. Pt with a 13.8% weight loss x 6 months, amount considered significant for timeframe. Noted pt for possible transfer to [address] [place] [address]. Meets Criteria for Severe Chronic Malnutrition as evidenced by:   [] Severe muscle wasting, loss of subcutaneous fat   [x] Nutritional intake of <75% of recommended intake for >1 month   [x] Weight loss of  >5% in 1 month, >7.5% in 3 months, >10% in 6 months, >20% in 1 year   [] Severe edema       SUBJECTIVE/OBJECTIVE:     Diet Order: Full liquids  % Eaten:  No data found.     Pertinent Medications: [x] Reviewed    Chemistries:  Lab Results   Component Value Date/Time    Sodium 137 07/12/2017 04:24 AM    Potassium 3.3 07/12/2017 04:24 AM    Chloride 105 07/12/2017 04:24 AM    CO2 26 07/12/2017 04:24 AM    Anion gap 6 07/12/2017 04:24 AM    Glucose 92 07/12/2017 04:24 AM    BUN 14 07/12/2017 04:24 AM    Creatinine 0.98 07/12/2017 04:24 AM    BUN/Creatinine ratio 14 07/12/2017 04:24 AM    GFR est AA >60 07/12/2017 04:24 AM    GFR est non-AA >60 07/12/2017 04:24 AM    Calcium 7.6 07/12/2017 04:24 AM    AST (SGOT) 34 07/12/2017 04:24 AM    Alk. phosphatase 92 07/12/2017 04:24 AM    Protein, total 4.7 07/12/2017 04:24 AM    Albumin 2.0 07/12/2017 04:24 AM    Globulin 2.7 07/12/2017 04:24 AM    A-G Ratio 0.7 07/12/2017 04:24 AM    ALT (SGPT) 23 07/12/2017 04:24 AM      Anthropometrics: Height: 5' 5\" (165.1 cm) Weight: 57 kg (125 lb 10.6 oz)    IBW (%IBW): 61.8 kg (136 lb 3.9 oz) ( ) UBW (%UBW):   (  %)    BMI: Body mass index is 20.91 kg/(m^2). This BMI is indicative of:   [] Underweight    [] Normal-borderline underweight   [] Overweight    []  Obesity    []  Extreme Obesity (BMI>40)  Estimated Nutrition Needs (Based on): 1923 Kcals/day (BMR (1287) X 1.3 AF + 250) , 68 g (1.2 g/Kg actual body wt) Protein  Carbohydrate:  At Least 130 g/day  Fluids: 1925 mL/day    Last BM: 7/11   []Active     []Hyperactive  []Hypoactive       [] Absent   BS  Skin:    [] Intact   [] Incision  [] Breakdown   [] DTI   [] Tears/Excoriation/Abrasion  []Edema [x] Other: laceration to back   Wt Readings from Last 30 Encounters:   07/11/17 57 kg (125 lb 10.6 oz)   07/06/15 67.1 kg (148 lb)      NUTRITION DIAGNOSES:   Problem:  Unintended weight loss   Altered GI Function  Etiology: related to multiple myeloma, chemo, decreased appetite   GI Bleed  Signs/Symptoms: as evidenced by 20# weight loss x 6 months  , blood in stool, decreased H/H    NUTRITION INTERVENTIONS:  Meals/Snacks: General/healthful diet   Supplements: Commercial supplement              GOAL:   Pt will consume >/=50% meals and ONS in next 3-5 days    Cultural, Congregation, or Ethnic Dietary Needs: None     LEARNING NEEDS (Diet, Food/Nutrient-Drug Interaction):    [x] None Identified   [] Identified and Education Provided/Documented   [] Identified and Pt declined/was not appropriate      [x] Interdisciplinary Care Plan Reviewed/Documented    [x] Discharge Needs:  tbd   [] No Nutrition Related Discharge Needs    NUTRITION RISK:   Pt Is At Nutrition Risk [x]     No Nutrition Risk Identified  []       PT SEEN FOR:    []  MD Consult: []Calorie Count      []Diabetic Diet Education        []Diet Education     []Electrolyte Management     []General Nutrition Management and Supplements     []Management of Tube Feeding     []TPN Recommendations    [x]  RN Referral:  [x]MST score >=2     []Enteral/Parenteral Nutrition PTA     []Pregnant: Gestational DM or Multigestation                 [] Pressure Ulcer      []  Low BMI      []  Length of Stay       [] Dysphagia Diet         [] Ventilator      []  Follow-Up      Previous Recommendations:   [] Implemented          [] Not Implemented          [x] Not Applicable    Previous Goal:   [] Met              [] Progressing Towards Goal              [] Not Progressing Towards Goal   [x] Not Applicable              Naeem Cardoza, [address] [place] [address]   Pager [phone]

## 2017-07-12 NOTE — PROGRESS NOTES
Bed was secured for pt @ [facility]. Nurse informed me that pt and his wife have decided they prefer to stay here @ [id number] [facility] weaned off of pressors,and hopefully discharged home tomorrow. I notified [facility] transfer center that pt has decided not to come to [facility] @ this time. I discussed pt with attending and with Barbara,oncology NP. Barbara and wife are both waiting to hear feedback from oncology @ [facility] as to when pt is to receive his next course of immunotherapy ,etc @ [facility] as it is currently scheduled for 8am tomorrow. Hopefully,this will be rescheduled by [facility].   Edwin Self

## 2017-07-12 NOTE — PROGRESS NOTES
Occupational Therapy Note    Reviewed chart and spoke with pt's nurse. Requested to defer therapy at this time as pt's hemodynamic status is unstable. Will attempt again when pt is able to full participate in therapy session.     Usha Mercado

## 2017-07-12 NOTE — PROGRESS NOTES
Michael Burnette [facility] 79  5[address] [facility], [address] [place] [address]  [phone]      Medical Progress Note      NAME:         Jayjay Rendon II   :        1955  MRM:        [id number]    Date:          2017      Subjective: Patient has been seen and examined as a follow up for acute GI bleeding, pancytopenia. Chart, labs, diagnostics reviewed. He had a BM last night that was not bloody or melanotic. He denies any nausea or vomiting. No fever. Has tolerated clears well. Objective:    Vital Signs:    Visit Vitals    BP 93/58    Pulse 71    Temp 98.1 °F (36.7 °C)    Resp 22    Ht 5' 5\" (1.651 m)    Wt 57 kg (125 lb 10.6 oz)    SpO2 100%    BMI 20.91 kg/m2        Intake/Output Summary (Last 24 hours) at 17 1132  Last data filed at 17 1104   Gross per 24 hour   Intake          2850.05 ml   Output             2375 ml   Net           475.05 ml        Physical Examination:    General:  Weak looking and not in much acute distress   Eyes:   pale conjunctivae, PERRLA with no discharge. ENT:   no ottorrhea or rhinorrhea with moist mucous membranes  Neck: no masses, thyroid non-tender and trachea central.  Pulm:  no accessory muscle use, decreased but clear breath sounds without crackles or wheezes  Card:  no JVD or murmurs, has regular and normal S1, S2 without thrills, bruits or peripheral edema  Abd:  Soft, non-tender, non-distended, normoactive bowel sounds with no palpable organomegaly  Musc:  No cyanosis, clubbing, atrophy or deformities. Skin:  No rashes, bruising or ulcers. Neuro: Awake and alert.  Generally a non focal exam. Follows commands appropriately  Psych:  Has a fair insight and is oriented x 3    Current Facility-Administered Medications   Medication Dose Route Frequency    0.9% sodium chloride infusion 250 mL  250 mL IntraVENous PRN    0.9% sodium chloride infusion 250 mL  250 mL IntraVENous PRN    0.9% sodium chloride infusion 250 mL  250 mL IntraVENous PRN    0.9% sodium chloride infusion 250 mL  250 mL IntraVENous PRN    sodium chloride (NS) flush 5-10 mL  5-10 mL IntraVENous Q8H    sodium chloride (NS) flush 5-10 mL  5-10 mL IntraVENous PRN    acetaminophen (TYLENOL) tablet 650 mg  650 mg Oral Q4H PRN    morphine injection 1 mg  1 mg IntraVENous Q4H PRN    naloxone (NARCAN) injection 0.4 mg  0.4 mg IntraVENous PRN    diphenhydrAMINE (BENADRYL) injection 12.5 mg  12.5 mg IntraVENous Q4H PRN    prochlorperazine (COMPAZINE) injection 5 mg  5 mg IntraVENous Q8H PRN    pantoprazole (PROTONIX) injection 40 mg  40 mg IntraVENous Q12H    acyclovir (ZOVIRAX) 200 mg/5 mL oral suspension 400 mg  400 mg Oral Q12H    midodrine (PROAMITINE) tablet 10 mg  10 mg Oral TID WITH MEALS    levoFLOXacin (LEVAQUIN) 750 mg in D5W IVPB  750 mg IntraVENous Q24H    metroNIDAZOLE (FLAGYL) IVPB premix 500 mg  500 mg IntraVENous Q8H    PHENYLephrine (NEOSYNEPHRINE) 30,000 mcg in 0.9% sodium chloride 250 mL infusion   mcg/min IntraVENous TITRATE    magnesium citrate solution 296 mL  296 mL Oral BID    pregabalin (LYRICA) capsule 100 mg  100 mg Oral DAILY    0.9% sodium chloride infusion  50 mL/hr IntraVENous CONTINUOUS        Laboratory data and review:    Recent Labs      07/12/17   0424  07/11/17   0550   WBC  2.8*  3.4*   HGB  7.7*  6.0*   HCT  22.1*  18.4*   PLT  13*  15*     Recent Labs      07/12/17   0424  07/11/17   0550   NA  137  137   K  3.3*  3.4*   CL  105  103   CO2  26  25   GLU  92  114*   BUN  14  21*   CREA  0.98  1.11   CA  7.6*  8.2*   ALB  2.0*  2.2*   SGOT  34  31   ALT  23  25   INR   --   1.1     No components found for: GLPOC    Assessment and Plan:    Acute GI bleeding (7/11/2017): presumed to be due to colitis with underlying pancytopenia. CT abdomen shoed multifocal colitis. Seen by GI but patient declines any further testing at our facility.  Currently relatively stable. Continue IV pantoprazole.      Pancytopenia due to chemotherapy ([facility] (7/11/2017)/ Anemia associated with acute blood loss (7/11/2017)/ Thrombocytopenia ([facility] (7/11/2017): she is sp 2 units PRBCs and 1 unit platelets. Hgb 7 and platelets now 15B. Transfusing another unit of platelets. Labs in AM. .     Acute colitis (7/12/2017)/ Diarrhea (7/11/2017): no more diarrhea. C diff pending but seems unlikely. DC enteric precautions. Continue empiric levofloxacin and metronidazole. Hypotension, chronic (7/11/2017): worsened by diarrhea and volume depletion. Baseline sBP in the low 90s. Wean off chevy synephrine. Continue Midodrine.       [facility] myeloma ([facility] (7/11/2017): usually follow up at [place]. Appreciate Oncology consult.      Hypokalemia (7/11/2017): replete some more.      Total time spent for the patient's care: [address] discussed with: Patient, Family, Nursing Staff and Consultant/Specialist    Discussed:  Care Plan and D/C Planning    Prophylaxis:  H2B/PPI    Anticipated Disposition:  Home w/Family           ___________________________________________________    Attending Physician:   Ronald Alvarado MD

## 2017-07-12 NOTE — ROUTINE PROCESS
@1900 Bedside and Verbal shift change report given to Kirsten Briscoe RN (oncoming nurse) by Cezar Rodriguez RN (offgoing nurse). Report included the following information SBAR, Kardex, ED Summary, Procedure Summary, Intake/Output, MAR, Accordion, Recent Results, Med Rec Status, Cardiac Rhythm Sinus and Alarm Parameters .

## 2017-07-12 NOTE — CONSULTS
[facility]     Name: Damaris Godinez MRN: [id number]   : 1955 Hospital: [address]   Date: 2017        Impression Plan   1. GI bleed  2. Hypotension  3. Colitis on CT  4. MM  5. Thrombocytopenia  6. Anemia  7. Diarrhea               · 500 cc boluls and continue IV fluids  · Awaiting second plt transfusion  · levoflox/flagyl  · Hematology following- recommending further care  · C-diff pending  · Midodrine  · Refusing further GI work-up here- No further melena since admitted  · For now PPI- transition to famotidine as soon as possible  · No AC  · Clear liquid diet       Radiology  ( personally reviewed) CT abdomen with discontinous colitis, rib findings consistent with MM and mild hydroureter, no hydronephrosis. ABG No results for input(s): PHI, PO2I, PCO2I in the last 72 hours. Subjective     Overnight events:  Feels Ok other than sacral pain. Wants to leave  Down to Lenny 30  Received 2 unit PRBC and 1 unit plts. Hgb 7.7. Plts 13  One well formed, non bloody stool overnight. A-febrile    Past Medical History:   Diagnosis Date    Multiple myeloma ([facility]       Past Surgical History:   Procedure Laterality Date    HX ORTHOPAEDIC      Rt hip, lt hip and femur, rt. humerus    HX ORTHOPAEDIC      titanium rods in back      Prior to Admission medications    Medication Sig Start Date End Date Taking? Authorizing Provider   pregabalin (LYRICA) 50 mg capsule Take 100 mg by mouth daily. Yes Carolyn Espitia MD   midodrine (PROAMITINE) 10 mg tablet Take 10 mg by mouth three (3) times daily (with meals). Yes Carolyn Espitia MD   montelukast (SINGULAIR) 10 mg tablet Take 10 mg by mouth two (2) days a week. The day before and day of immunotherapy. Based on current schedule, take on Thursday and Friday. Yes Carolyn Espitia MD   Omeprazole delayed release (PRILOSEC D/R) 20 mg tablet Take 20 mg by mouth two (2) times a day.    Yes Carolyn Espitia MD   acyclovir (ZOVIRAX) 200 mg/5 mL suspension Take 400 mg by mouth every twelve (12) hours. Yes Carolyn Espitia MD   dexamethasone (DECADRON) 4 mg tablet Take 4 mg by mouth Every Friday. The day after immunotherapy    Yes Historical Provider   OLANZapine (ZYPREXA) 5 mg tablet Take 5 mg by mouth nightly as needed. Yes Historical Provider   daratumumab (DARZALEX) 20 mg/mL injection by IntraVENous route Every Thursday. Immunotherapy administered at [facility]   Yes Historical Provider   multivitamin (ONE A DAY) tablet Take 1 Tab by mouth daily. Yes Carolyn Espitia MD     Current Facility-Administered Medications   Medication Dose Route Frequency    potassium chloride SR (KLOR-CON 10) tablet 40 mEq  40 mEq Oral NOW    0.9% sodium chloride infusion 500 mL  500 mL IntraVENous ONCE    sodium chloride (NS) flush 5-10 mL  5-10 mL IntraVENous Q8H    pantoprazole (PROTONIX) injection 40 mg  40 mg IntraVENous Q12H    acyclovir (ZOVIRAX) 200 mg/5 mL oral suspension 400 mg  400 mg Oral Q12H    midodrine (PROAMITINE) tablet 10 mg  10 mg Oral TID WITH MEALS    levoFLOXacin (LEVAQUIN) 750 mg in D5W IVPB  750 mg IntraVENous Q24H    metroNIDAZOLE (FLAGYL) IVPB premix 500 mg  500 mg IntraVENous Q8H    PHENYLephrine (NEOSYNEPHRINE) 30,000 mcg in 0.9% sodium chloride 250 mL infusion   mcg/min IntraVENous TITRATE    magnesium citrate solution 296 mL  296 mL Oral BID    pregabalin (LYRICA) capsule 100 mg  100 mg Oral DAILY    0.9% sodium chloride infusion  50 mL/hr IntraVENous CONTINUOUS     No Known Allergies   Social History   Substance Use Topics    Smoking status: Never Smoker    Smokeless tobacco: Never Used    Alcohol use Yes      Comment: socially, wine      Family History   Problem Relation Age of Onset    Diabetes Neg Hx           Laboratory: I have personally reviewed the critical care flowsheet and labs.      Recent Labs      07/12/17   0424  07/11/17   0550   WBC  2.8*  3.4*   HGB  7.7*  6.0*   HCT  22.1*  18.4*   PLT  13*  15* Recent Labs      07/12/17   0424  07/11/17   0550   NA  137  137   K  3.3*  3.4*   CL  105  103   CO2  26  25   GLU  92  114*   BUN  14  21*   CREA  0.98  1.11   CA  7.6*  8.2*   ALB  2.0*  2.2*   SGOT  34  31   ALT  23  25   INR   --   1.1       Objective:     Mode Rate Tidal Volume Pressure FiO2 PEEP                    Vital Signs:     TMAX(24)      Intake/Output:   Last shift:         Last 3 shifts:  RRIOLAST3    Intake/Output Summary (Last 24 hours) at 07/12/17 0847  Last data filed at 07/12/17 0600   Gross per 24 hour   Intake          2533.65 ml   Output             2125 ml   Net           408.65 ml     EXAM:   GENERAL: well developed and in no distress, thin HEENT:  PERRL, EOMI, no alar flaring or epistaxis, oral mucosa moist without cyanosis, NECK:  no jugular vein distention, no retractions, no thyromegaly or masses, LUNGS: CTA, no w/r/r HEART:  Regular rate and rhythm with no MGR; +1 edema is present in LE, ABDOMEN:  soft with no tenderness, bowel sounds present, EXTREMITIES:  warm with no cyanosis, SKIN:  no jaundice or ecchymosis and NEUROLOGIC:  alert and oriented, grossly non-focal    Nele Cam MD  [facility]

## 2017-07-12 NOTE — PROGRESS NOTES
Shift Summary    0700: Patient awake and resting in bed. He voices that he wants to go home today. Lenny at 30. Urinal at the bedside. Call bell within reach. 0805:  Patient ordered one unit on platelets. Patient aware. Consent already on chart. 1045:  Platelet transfusion started. 1100:  Platelet transfusion completed. No complications noted. Patient without complaint. Patient transferred to air bed. 1150:  [facility] called. They have a bed available. 1220:  Per the patient and his wife, they now want to stay here.  aware. 1345:  Patient's wife pointed out a hardened area on patient's left lower leg. Patient denies pain at the site. There is no redness or warmth noted. 1448:  Lenny stopped. Patient resting quietly with bed in chair position and wife at bedside. 1815:  Spoke with Dr. Prosper Rodriguez on the phone. Patient is off lenny. Ok to make him stepdown. Per patient and wife, they would prefer to stay here overnight. [id number] Danitza Khan with Dr. Prosper Rodriguez to remain in ICU overnight and plan to discharge tomorrow.

## 2017-07-12 NOTE — ROUTINE PROCESS
0700 Shift change report received from Dillon Tamez. SBAR, Kardex and Cardiac Rhythm NSR reviewed. 1900  Bedside report given to Dillon Tamez. SBAR, Kardex, Recent Results and Cardiac Rhythm NSR reviewed. Patient is stable at this time. Call light within reach, bed alarm on, bed in low position.

## 2017-07-12 NOTE — PROGRESS NOTES
[id number] [facility] at [facility]  [phone]    Hematology / Oncology Follow up    Reason for Visit:   Kun Rodriguez is a 57 y.o. male who is seen in consultation at the request of Dr. Rex Martinez  for evaluation of GI bleed, Multiple myeloma and thrombocytopenia. History of Present Illness:     Mr Rasheeda Hernandez was admitted on 7/11/2017 from the ED when he presented with c/o acute bloody diarrhea. Hx of MM and is currently undergoing treatment at [facility] under the care of Dr Mani Dixon. Has been receiving supportive care of  plt transfusions prn. ED labs Hgb 6 WBC 3.4  ANC 1.9  plt 15. Therefore he was admitted for further eval and management. Mr Rasheeda Hernandez denies any pain, SOB or N/V. States appetite has been good for the last several weeks. Hx from his wife, Cecilia Delatorre states her  woke up about 4:30 am and had a large BM that looked black and tarry with some bright red blood noted; then had a second one within a few minutes; she got nervous and called 911; before they could arrive he had another stool with blood noted as well. Denies blood in urine or nose bleeds or any emesis with blood. Reports he has had fevers off and on; running from 99.3-100.4. Has had admissions for fevers but they can never find the source. Has been on recent numerous abx in the hospital and was sent home with Levaquin and he just completed that a few days ago. His appetite has really improved over the last several days since he was started on Protonix and  Sucralfate. Has been mostly in the bed or couch since Jan when he had the fx femur. Was due to start PT at home. Has upper body strength so has been able to transfer himself to the car from the  with a board. Has been receiving mainly plt transfusions at [facility]. [facility] records reviewed       Interval History:     Sitting up in bed; eating lunch; states feeling better. Discomfort to butt area; mattress helping. Denies any SOB.  Denies nausea. Wife at bedside. Wondering if they should start the new medication and should they go for treatment this week; has a call into [facility]. Current Facility-Administered Medications   Medication Dose Route Frequency    0.9% sodium chloride infusion 250 mL  250 mL IntraVENous PRN    0.9% sodium chloride infusion 250 mL  250 mL IntraVENous PRN    0.9% sodium chloride infusion 250 mL  250 mL IntraVENous PRN    0.9% sodium chloride infusion 250 mL  250 mL IntraVENous PRN    sodium chloride (NS) flush 5-10 mL  5-10 mL IntraVENous Q8H    sodium chloride (NS) flush 5-10 mL  5-10 mL IntraVENous PRN    acetaminophen (TYLENOL) tablet 650 mg  650 mg Oral Q4H PRN    morphine injection 1 mg  1 mg IntraVENous Q4H PRN    naloxone (NARCAN) injection 0.4 mg  0.4 mg IntraVENous PRN    diphenhydrAMINE (BENADRYL) injection 12.5 mg  12.5 mg IntraVENous Q4H PRN    prochlorperazine (COMPAZINE) injection 5 mg  5 mg IntraVENous Q8H PRN    pantoprazole (PROTONIX) injection 40 mg  40 mg IntraVENous Q12H    acyclovir (ZOVIRAX) 200 mg/5 mL oral suspension 400 mg  400 mg Oral Q12H    midodrine (PROAMITINE) tablet 10 mg  10 mg Oral TID WITH MEALS    levoFLOXacin (LEVAQUIN) 750 mg in D5W IVPB  750 mg IntraVENous Q24H    metroNIDAZOLE (FLAGYL) IVPB premix 500 mg  500 mg IntraVENous Q8H    PHENYLephrine (NEOSYNEPHRINE) 30,000 mcg in 0.9% sodium chloride 250 mL infusion   mcg/min IntraVENous TITRATE    magnesium citrate solution 296 mL  296 mL Oral BID    pregabalin (LYRICA) capsule 100 mg  100 mg Oral DAILY    0.9% sodium chloride infusion  50 mL/hr IntraVENous CONTINUOUS      No Known Allergies     Review of Systems: A complete review of systems was obtained, negative except as described above.     Physical Exam:     Visit Vitals    BP 93/58    Pulse 71    Temp 98.2 °F (36.8 °C)    Resp 22    Ht 5' 5\" (1.651 m)    Wt 57 kg (125 lb 10.6 oz)    SpO2 100%    BMI 20.91 kg/m2     ECOG PS: 4  General: No distress  Eyes: PERRLA, anicteric sclerae  HENT: Atraumatic, OP clear  Neck: Supple  Respiratory:  normal respiratory effort  CV: Normal rate, regular rhythm, no murmurs, 1+ lower extremity edema  GI: Soft, nontender, nondistended, no masses, no hepatomegaly, no splenomegaly  Skin: No rashes, ecchymoses, or petechiae. Normal temperature, turgor, and texture. Psych: Alert, oriented, appropriate affect, normal judgment/insight  Neuro: CN II-XII intact    Results:     Lab Results   Component Value Date/Time    WBC 2.8 07/12/2017 04:24 AM    HGB 7.7 07/12/2017 04:24 AM    HCT 22.1 07/12/2017 04:24 AM    PLATELET 13 39/48/6347 04:24 AM    MCV 88.8 07/12/2017 04:24 AM    ABS. NEUTROPHILS 1.7 07/12/2017 04:24 AM     Lab Results   Component Value Date/Time    Sodium 137 07/12/2017 04:24 AM    Potassium 3.3 07/12/2017 04:24 AM    Chloride 105 07/12/2017 04:24 AM    CO2 26 07/12/2017 04:24 AM    Glucose 92 07/12/2017 04:24 AM    BUN 14 07/12/2017 04:24 AM    Creatinine 0.98 07/12/2017 04:24 AM    GFR est AA >60 07/12/2017 04:24 AM    GFR est non-AA >60 07/12/2017 04:24 AM    Calcium 7.6 07/12/2017 04:24 AM     Lab Results   Component Value Date/Time    Bilirubin, total 0.7 07/12/2017 04:24 AM    ALT (SGPT) 23 07/12/2017 04:24 AM    AST (SGOT) 34 07/12/2017 04:24 AM    Alk. phosphatase 92 07/12/2017 04:24 AM    Protein, total 4.7 07/12/2017 04:24 AM    Albumin 2.0 07/12/2017 04:24 AM    Globulin 2.7 07/12/2017 04:24 AM     Lab Results   Component Value Date/Time    MORRIS Poly POS 07/11/2017 05:50 AM     Lab Results   Component Value Date/Time    INR 1.1 07/11/2017 05:50 AM     7/11/2017 CT A/P W WO CONT  IMPRESSION:     1. Discontinuous multifocal colitis is most likely infectious or inflammatory. 2. No CT evidence of active intraluminal gastrointestinal hemorrhage. 3. Trace bilateral pleural effusions. Bibasilar atelectasis. 4. Bone findings are compatible with the provided diagnosis of multiple myeloma.   5.5 x 2.6 x 3.2 cm soft tissue mass arises from the right fifth rib and extends  into the right pleural space. 5. Subcentimeter flash filling hemangioma is in hepatic segment 8.  6. 2.7 cm right ureterocele extends into the lumen of the urinary bladder. As  result, there is mild right hydroureter. No hydronephrosis. Other incidental  findings are described above. If comparison imaging becomes available, I can addend this report.        Assessment and Recommendations:   1. GI bleed  CT with evidence of multifocal colitis; infectious vs inflammatory  GI consulted; patient and wife declining any additional testing at this time    abx per primary team      2. Multiple Myeloma, refractory (dx 2002)  Being followed by Dr Riley Steel at [facility]  Current treatment with combination Daratumumab and Pomalidomide po (1 mg). Received C#1 Daratumumab on July 6; plan was for weekly x 7 doses  Pomalidomide had not arrived; therefore has not been initiated    Mr Nav Marx has follow up at [facility] for this Friday am. Patient and wife informed. 3. Anemia, normocytic (s/p 2 units PRBCs)  Secondary to MM  Responded to transfusion  Recommend continue supportive care; transfuse for Hgb < 7      4. Thrombocytopenia (s/p 1 unit plt)  Secondary to MM  Will order plt for today  Recommend continue supportive care; transfuse for plt < 50 with current GI bleed    The above exam and treatment plan were reviewed with Dr Nicole Conti.     Signed By: Alphonsus Prader, NP     July 12, 2017

## 2017-07-13 VITALS
TEMPERATURE: 98.4 F | HEIGHT: 65 IN | HEART RATE: 69 BPM | WEIGHT: 125.66 LBS | SYSTOLIC BLOOD PRESSURE: 125 MMHG | DIASTOLIC BLOOD PRESSURE: 69 MMHG | RESPIRATION RATE: 17 BRPM | BODY MASS INDEX: 20.94 KG/M2 | OXYGEN SATURATION: 97 %

## 2017-07-13 LAB
ANION GAP BLD CALC-SCNC: 12 MMOL/L (ref 5–15)
BASOPHILS # BLD AUTO: 0 K/UL (ref 0–0.1)
BASOPHILS # BLD AUTO: 0 K/UL (ref 0–0.1)
BASOPHILS # BLD: 0 % (ref 0–1)
BASOPHILS # BLD: 0 % (ref 0–1)
BLD PROD TYP BPU: NORMAL
BPU ID: NORMAL
BUN SERPL-MCNC: 8 MG/DL (ref 6–20)
BUN/CREAT SERPL: 9 (ref 12–20)
CALCIUM SERPL-MCNC: 7.9 MG/DL (ref 8.5–10.1)
CHLORIDE SERPL-SCNC: 106 MMOL/L (ref 97–108)
CO2 SERPL-SCNC: 22 MMOL/L (ref 21–32)
CREAT SERPL-MCNC: 0.88 MG/DL (ref 0.7–1.3)
DIFFERENTIAL METHOD BLD: ABNORMAL
DIFFERENTIAL METHOD BLD: ABNORMAL
EOSINOPHIL # BLD: 0 K/UL (ref 0–0.4)
EOSINOPHIL # BLD: 0 K/UL (ref 0–0.4)
EOSINOPHIL NFR BLD: 0 % (ref 0–7)
EOSINOPHIL NFR BLD: 0 % (ref 0–7)
ERYTHROCYTE [DISTWIDTH] IN BLOOD BY AUTOMATED COUNT: 16.8 % (ref 11.5–14.5)
ERYTHROCYTE [DISTWIDTH] IN BLOOD BY AUTOMATED COUNT: 18 % (ref 11.5–14.5)
GLUCOSE SERPL-MCNC: 94 MG/DL (ref 65–100)
HCT VFR BLD AUTO: 20 % (ref 36.6–50.3)
HCT VFR BLD AUTO: 28.2 % (ref 36.6–50.3)
HGB BLD-MCNC: 6.8 G/DL (ref 12.1–17)
HGB BLD-MCNC: 9.8 G/DL (ref 12.1–17)
LYMPHOCYTES # BLD AUTO: 31 % (ref 12–49)
LYMPHOCYTES # BLD AUTO: 31 % (ref 12–49)
LYMPHOCYTES # BLD: 0.5 K/UL (ref 0.8–3.5)
LYMPHOCYTES # BLD: 0.6 K/UL (ref 0.8–3.5)
MCH RBC QN AUTO: 30.2 PG (ref 26–34)
MCH RBC QN AUTO: 30.4 PG (ref 26–34)
MCHC RBC AUTO-ENTMCNC: 34 G/DL (ref 30–36.5)
MCHC RBC AUTO-ENTMCNC: 34.8 G/DL (ref 30–36.5)
MCV RBC AUTO: 87.6 FL (ref 80–99)
MCV RBC AUTO: 88.9 FL (ref 80–99)
MONOCYTES # BLD: 0 K/UL (ref 0–1)
MONOCYTES # BLD: 0.1 K/UL (ref 0–1)
MONOCYTES NFR BLD AUTO: 3 % (ref 5–13)
MONOCYTES NFR BLD AUTO: 4 % (ref 5–13)
NEUTS SEG # BLD: 1 K/UL (ref 1.8–8)
NEUTS SEG # BLD: 1.3 K/UL (ref 1.8–8)
NEUTS SEG NFR BLD AUTO: 65 % (ref 32–75)
NEUTS SEG NFR BLD AUTO: 66 % (ref 32–75)
NRBC # BLD: 0.02 K/UL (ref 0–0.01)
NRBC # BLD: 0.02 K/UL (ref 0–0.01)
NRBC BLD-RTO: 1.2 PER 100 WBC
NRBC BLD-RTO: 1.5 PER 100 WBC
NRBC BLD-RTO: 2 PER 100 WBC
PLATELET # BLD AUTO: 13 K/UL (ref 150–400)
PLATELET # BLD AUTO: 27 K/UL (ref 150–400)
POTASSIUM SERPL-SCNC: 3.2 MMOL/L (ref 3.5–5.1)
RBC # BLD AUTO: 2.25 M/UL (ref 4.1–5.7)
RBC # BLD AUTO: 3.22 M/UL (ref 4.1–5.7)
RBC MORPH BLD: ABNORMAL
RBC MORPH BLD: ABNORMAL
SODIUM SERPL-SCNC: 140 MMOL/L (ref 136–145)
STATUS OF UNIT,%ST: NORMAL
UNIT DIVISION, %UDIV: 0
WBC # BLD AUTO: 1.5 K/UL (ref 4.1–11.1)
WBC # BLD AUTO: 2 K/UL (ref 4.1–11.1)
WBC MORPH BLD: ABNORMAL
WBC NRBC COR # BLD: ABNORMAL 10*3/UL
WBC NRBC COR # BLD: ABNORMAL 10*3/UL

## 2017-07-13 PROCEDURE — 74011000250 HC RX REV CODE- 250: Performed by: INTERNAL MEDICINE

## 2017-07-13 PROCEDURE — C9113 INJ PANTOPRAZOLE SODIUM, VIA: HCPCS | Performed by: INTERNAL MEDICINE

## 2017-07-13 PROCEDURE — 74011250637 HC RX REV CODE- 250/637: Performed by: INTERNAL MEDICINE

## 2017-07-13 PROCEDURE — P9037 PLATE PHERES LEUKOREDU IRRAD: HCPCS | Performed by: INTERNAL MEDICINE

## 2017-07-13 PROCEDURE — P9040 RBC LEUKOREDUCED IRRADIATED: HCPCS | Performed by: EMERGENCY MEDICINE

## 2017-07-13 PROCEDURE — 36430 TRANSFUSION BLD/BLD COMPNT: CPT

## 2017-07-13 PROCEDURE — 74011250636 HC RX REV CODE- 250/636: Performed by: INTERNAL MEDICINE

## 2017-07-13 PROCEDURE — 77030029131 HC ADMN ST IV BLD N DEHP ICUM -B

## 2017-07-13 PROCEDURE — 80048 BASIC METABOLIC PNL TOTAL CA: CPT | Performed by: INTERNAL MEDICINE

## 2017-07-13 PROCEDURE — 85025 COMPLETE CBC W/AUTO DIFF WBC: CPT | Performed by: INTERNAL MEDICINE

## 2017-07-13 PROCEDURE — 36415 COLL VENOUS BLD VENIPUNCTURE: CPT | Performed by: INTERNAL MEDICINE

## 2017-07-13 RX ORDER — POTASSIUM CHLORIDE 750 MG/1
40 TABLET, FILM COATED, EXTENDED RELEASE ORAL EVERY 4 HOURS
Status: COMPLETED | OUTPATIENT
Start: 2017-07-13 | End: 2017-07-13

## 2017-07-13 RX ORDER — SODIUM CHLORIDE 9 MG/ML
250 INJECTION, SOLUTION INTRAVENOUS AS NEEDED
Status: DISCONTINUED | OUTPATIENT
Start: 2017-07-13 | End: 2017-07-13 | Stop reason: HOSPADM

## 2017-07-13 RX ORDER — HEPARIN 100 UNIT/ML
300 SYRINGE INTRAVENOUS AS NEEDED
Status: DISCONTINUED | OUTPATIENT
Start: 2017-07-13 | End: 2017-07-13 | Stop reason: HOSPADM

## 2017-07-13 RX ORDER — LEVOFLOXACIN 750 MG/1
750 TABLET ORAL DAILY
Qty: 5 TAB | Refills: 0 | Status: SHIPPED | OUTPATIENT
Start: 2017-07-13 | End: 2017-07-18

## 2017-07-13 RX ORDER — METRONIDAZOLE 500 MG/1
500 TABLET ORAL 3 TIMES DAILY
Qty: 15 TAB | Refills: 0 | Status: SHIPPED | OUTPATIENT
Start: 2017-07-13 | End: 2017-07-18

## 2017-07-13 RX ADMIN — POTASSIUM CHLORIDE 40 MEQ: 750 TABLET, FILM COATED, EXTENDED RELEASE ORAL at 07:54

## 2017-07-13 RX ADMIN — PANTOPRAZOLE SODIUM 40 MG: 40 INJECTION, POWDER, FOR SOLUTION INTRAVENOUS at 08:03

## 2017-07-13 RX ADMIN — METRONIDAZOLE 500 MG: 500 INJECTION, SOLUTION INTRAVENOUS at 02:46

## 2017-07-13 RX ADMIN — MIDODRINE HYDROCHLORIDE 10 MG: 5 TABLET ORAL at 07:54

## 2017-07-13 RX ADMIN — PREGABALIN 100 MG: 50 CAPSULE ORAL at 08:03

## 2017-07-13 RX ADMIN — ACYCLOVIR 400 MG: 200 SUSPENSION ORAL at 08:03

## 2017-07-13 RX ADMIN — METRONIDAZOLE 500 MG: 500 INJECTION, SOLUTION INTRAVENOUS at 11:18

## 2017-07-13 RX ADMIN — LEVOFLOXACIN 750 MG: 5 INJECTION, SOLUTION INTRAVENOUS at 11:40

## 2017-07-13 RX ADMIN — SODIUM CHLORIDE 250 ML: 900 INJECTION, SOLUTION INTRAVENOUS at 09:51

## 2017-07-13 RX ADMIN — MIDODRINE HYDROCHLORIDE 10 MG: 5 TABLET ORAL at 11:13

## 2017-07-13 RX ADMIN — POTASSIUM CHLORIDE 40 MEQ: 750 TABLET, FILM COATED, EXTENDED RELEASE ORAL at 11:13

## 2017-07-13 RX ADMIN — SODIUM CHLORIDE, PRESERVATIVE FREE 300 UNITS: 5 INJECTION INTRAVENOUS at 13:58

## 2017-07-13 RX ADMIN — Medication 10 ML: at 05:15

## 2017-07-13 NOTE — PROGRESS NOTES
@0250 BM noted. AM lab work obtain and sent. @80 Notified Dr. Corinna Marmolejo of am lab work and received orders to transfuse 1 unit of platelets and 2 units of PRBCs. @0505 1st unit of PRBCs started. @1987 Bedside and Verbal shift change report given to [address] (oncoming nurse) by Maggie Riojas RN (offgoing nurse). Report included the following information SBAR, Kardex, ED Summary, Procedure Summary, Intake/Output, MAR, Accordion, Recent Results, Med Rec Status, Cardiac Rhythm Sinus and Alarm Parameters .

## 2017-07-13 NOTE — PROGRESS NOTES
[facility]     Name: Jayson Patino MRN: [id number]   : 1955 Hospital: [address]   Date: 2017        Impression Plan   1. GI bleed  2. Hypotension  3. Colitis on CT  4. MM  5. Thrombocytopenia  6. Anemia  7. Diarrhea               · Pressors off  · 2 units PRBC and plts this morning  · Narrow empiric abx per primary team  · Follow up with [facility] hematology tomorrow  · C-diff not sent due to formed stools  · Midodrine  · PPI  · No AC  · Advance diet  · To be discharge home today after blood transfusions  · Discussed with Dr. Mandy Quintana       Radiology  ( personally reviewed) CT abdomen with discontinous colitis, rib findings consistent with MM and mild hydroureter, no hydronephrosis. ABG No results for input(s): PHI, PO2I, PCO2I in the last 72 hours. Subjective     Overnight events:  Off of serenity  Hgb 6.8  No signs of GI bleeding- brown stool  A-febrile    Past Medical History:   Diagnosis Date    Multiple myeloma ([facility] [address]       Past Surgical History:   Procedure Laterality Date    HX ORTHOPAEDIC      Rt hip, lt hip and femur, rt. humerus    HX ORTHOPAEDIC      titanium rods in back      Prior to Admission medications    Medication Sig Start Date End Date Taking? Authorizing Provider   metroNIDAZOLE (FLAGYL) 500 mg tablet Take 1 Tab by mouth three (3) times daily for 5 days. 17 Yes Margaret Cross MD   levoFLOXacin (LEVAQUIN) 750 mg tablet Take 1 Tab by mouth daily for 5 days. 17 Yes Margaret Cross MD   pregabalin (LYRICA) 50 mg capsule Take 100 mg by mouth daily. Yes Carolyn Espitia MD   midodrine (PROAMITINE) 10 mg tablet Take 10 mg by mouth three (3) times daily (with meals). Yes Carolyn Espitia MD   montelukast (SINGULAIR) 10 mg tablet Take 10 mg by mouth two (2) days a week. The day before and day of immunotherapy. Based on current schedule, take on Thursday and Friday.    Yes Carolyn Espitia MD   Omeprazole delayed release (PRILOSEC D/R) 20 mg tablet Take 20 mg by mouth two (2) times a day. Yes Carolyn Espitia MD   acyclovir (ZOVIRAX) 200 mg/5 mL suspension Take 400 mg by mouth every twelve (12) hours. Yes Carolyn Espitia MD   dexamethasone (DECADRON) 4 mg tablet Take 4 mg by mouth Every Friday. The day after immunotherapy    Yes Historical Provider   OLANZapine (ZYPREXA) 5 mg tablet Take 5 mg by mouth nightly as needed. Yes Historical Provider   daratumumab (DARZALEX) 20 mg/mL injection by IntraVENous route Every Thursday. Immunotherapy administered at [facility]   Yes Historical Provider   multivitamin (ONE A DAY) tablet Take 1 Tab by mouth daily. Yes Carolyn Espitia MD     Current Facility-Administered Medications   Medication Dose Route Frequency    potassium chloride SR (KLOR-CON 10) tablet 40 mEq  40 mEq Oral Q4H    sodium chloride (NS) flush 5-10 mL  5-10 mL IntraVENous Q8H    pantoprazole (PROTONIX) injection 40 mg  40 mg IntraVENous Q12H    acyclovir (ZOVIRAX) 200 mg/5 mL oral suspension 400 mg  400 mg Oral Q12H    midodrine (PROAMITINE) tablet 10 mg  10 mg Oral TID WITH MEALS    levoFLOXacin (LEVAQUIN) 750 mg in D5W IVPB  750 mg IntraVENous Q24H    metroNIDAZOLE (FLAGYL) IVPB premix 500 mg  500 mg IntraVENous Q8H    PHENYLephrine (NEOSYNEPHRINE) 30,000 mcg in 0.9% sodium chloride 250 mL infusion   mcg/min IntraVENous TITRATE    pregabalin (LYRICA) capsule 100 mg  100 mg Oral DAILY    0.9% sodium chloride infusion  50 mL/hr IntraVENous CONTINUOUS     No Known Allergies   Social History   Substance Use Topics    Smoking status: Never Smoker    Smokeless tobacco: Never Used    Alcohol use Yes      Comment: socially, wine      Family History   Problem Relation Age of Onset    Diabetes Neg Hx           Laboratory: I have personally reviewed the critical care flowsheet and labs.      Recent Labs      07/13/17   0247  07/12/17   0424  07/11/17   0550   WBC  1.5*  2.8*  3.4*   HGB  6.8*  7.7*  6.0*   HCT  20.0*  22.1*  18.4* PLT  13*  13*  15*     Recent Labs      07/13/17   0247  07/12/17   0424  07/11/17   0550   NA  140  137  137   K  3.2*  3.3*  3.4*   CL  106  105  103   CO2  22  26  25   GLU  94  92  114*   BUN  8  14  21*   CREA  0.88  0.98  1.11   CA  7.9*  7.6*  8.2*   ALB   --   2.0*  2.2*   SGOT   --   34  31   ALT   --   23  25   INR   --    --   1.1       Objective:     Mode Rate Tidal Volume Pressure FiO2 PEEP                    Vital Signs:     TMAX(24)      Intake/Output:   Last shift:         Last 3 shifts: 07/13 0701 - 07/13 1900  In: 0   Out: 425 [Urine:425]RRIOLAST3    Intake/Output Summary (Last 24 hours) at 07/13/17 1000  Last data filed at 07/13/17 7939   Gross per 24 hour   Intake          2396.98 ml   Output             2455 ml   Net           -58.02 ml     EXAM:   GENERAL: well developed and in no distress, thin, good color HEENT:  PERRL, EOMI, no alar flaring or epistaxis, oral mucosa moist without cyanosis, NECK:  no jugular vein distention, no retractions, no thyromegaly or masses, LUNGS: CTA, no w/r/r HEART:  Regular rate and rhythm with no MGR; +1 edema is present in LE, ABDOMEN:  soft with no tenderness, bowel sounds present, EXTREMITIES:  warm with no cyanosis, SKIN:  no jaundice or ecchymosis and NEUROLOGIC:  alert and oriented, grossly non-focal but weak    Lacretia MD Real  [facility]

## 2017-07-13 NOTE — PROGRESS NOTES
Bedside and Verbal shift change report given to [address] (oncoming nurse) by Femi Vieira (offgoing nurse). Report included the following information SBAR, Kardex, Intake/Output, MAR, Recent Results and Cardiac Rhythm NSR.     0730  Pt resting comfortably in bed with blood running at 150ml/hr. Urinal emptied = 150ml/hr. Pt has no complaints of pain. Pt turned to right side. 0845  2nd unit of blood started. Wife at bedside. 0900  Pt with loose, brown BM. Pt cleaned up and turned to left side. 1008  Platelets infusing. 1055  Pt is sleeping. 2nd unit of blood still infusing with normal saline flushing the line. Wife at bedside. 1259  Received call from lab with critical platelet result of 27. Will notify MD. Reported platelet result to Dr. Manuel Mon and verbal order that pt can be discharged at this time. 1310  I have reviewed discharge instructions with the patient and spouse. The patient and spouse verbalized understanding. IV removed. Heart monitor removed. Prescriptions provided. 1403  Right chest port deaccessed. Flushed with 3ml of heparin.

## 2017-07-13 NOTE — PROGRESS NOTES
Pharmacist Discharge Medication Reconciliation    Discharge Provider:  Dandre Valladares       Discharge Medications:      Current Discharge Medication List        START taking these medications         Dose & Instructions Dispensing Information Comments   Morning Noon Evening Bedtime      levoFLOXacin 750 mg tablet   Commonly known as:  LEVAQUIN       Your last dose was: Your next dose is:              Dose:  750 mg   Take 1 Tab by mouth daily for 5 days. Quantity:  5 Tab   Refills:  0                         metroNIDAZOLE 500 mg tablet   Commonly known as:  FLAGYL       Your last dose was: Your next dose is:              Dose:  500 mg   Take 1 Tab by mouth three (3) times daily for 5 days. Quantity:  15 Tab   Refills:  0                               CONTINUE these medications which have NOT CHANGED         Dose & Instructions Dispensing Information Comments   Morning Noon Evening Bedtime      acyclovir 200 mg/5 mL suspension   Commonly known as:  ZOVIRAX       Your last dose was: Your next dose is:              Dose:  400 mg   Take 400 mg by mouth every twelve (12) hours. Refills:  0                         daratumumab 20 mg/mL injection   Commonly known as:  DARZALEX       Your last dose was: Your next dose is:              by IntraVENous route Every Thursday. Immunotherapy administered at [facility]:  0                         dexamethasone 4 mg tablet   Commonly known as:  DECADRON       Your last dose was: Your next dose is:              Dose:  4 mg   Take 4 mg by mouth Every Friday. The day after immunotherapy    Refills:  0                         LYRICA 50 mg capsule   Generic drug:  pregabalin       Your last dose was: Your next dose is:              Dose:  100 mg   Take 100 mg by mouth daily. Refills:  0                         midodrine 10 mg tablet   Commonly known as:  PROAMITINE       Your last dose was:          Your next dose is: Dose:  10 mg   Take 10 mg by mouth three (3) times daily (with meals). Refills:  0                         montelukast 10 mg tablet   Commonly known as:  SINGULAIR       Your last dose was: Your next dose is:              Dose:  10 mg   Take 10 mg by mouth two (2) days a week. The day before and day of immunotherapy. Based on current schedule, take on Thursday and Friday. Refills:  0                         multivitamin tablet   Commonly known as:  ONE A DAY       Your last dose was: Your next dose is:              Dose:  1 Tab   Take 1 Tab by mouth daily. Refills:  0                         OLANZapine 5 mg tablet   Commonly known as:  ZyPREXA       Your last dose was: Your next dose is:              Dose:  5 mg   Take 5 mg by mouth nightly as needed. Refills:  0                         Omeprazole delayed release 20 mg tablet   Commonly known as:  PRILOSEC D/R       Your last dose was: Your next dose is:              Dose:  20 mg   Take 20 mg by mouth two (2) times a day. Refills:  0                                  Where to Get Your Medications        Information on where to get these meds will be given to you by the nurse or doctor. !  Ask your nurse or doctor about these medications     levoFLOXacin 750 mg tablet    metroNIDAZOLE 500 mg tablet                     The patient's chart, MAR, and AVS were reviewed by   Malissa Vargas,   Contact: [phone]

## 2017-07-13 NOTE — PROGRESS NOTES
[address] [facility] Oncology at [address] [facility]  [phone]    Hematology / Oncology Follow up    Reason for Visit:   Kun Rodriguez is a 57 y.o. male who is seen in consultation at the request of Dr. Rex Martinez  for evaluation of GI bleed, Multiple myeloma and thrombocytopenia. History of Present Illness:     Mr Rasheeda Hernandez was admitted on 7/11/2017 from the ED when he presented with c/o acute bloody diarrhea. Hx of MM and is currently undergoing treatment at [facility] under the care of Dr Mani Dixon. Has been receiving supportive care of  plt transfusions prn. ED labs Hgb 6 WBC 3.4  ANC 1.9  plt 15. Therefore he was admitted for further eval and management. Mr Rasheeda Hernandez denies any pain, SOB or N/V. States appetite has been good for the last several weeks. Hx from his wife, Cecilia Delatorre states her  woke up about 4:30 am and had a large BM that looked black and tarry with some bright red blood noted; then had a second one within a few minutes; she got nervous and called 911; before they could arrive he had another stool with blood noted as well. Denies blood in urine or nose bleeds or any emesis with blood. Reports he has had fevers off and on; running from 99.3-100.4. Has had admissions for fevers but they can never find the source. Has been on recent numerous abx in the hospital and was sent home with Levaquin and he just completed that a few days ago. His appetite has really improved over the last several days since he was started on Protonix and  Sucralfate. Has been mostly in the bed or couch since Jan when he had the fx femur. Was due to start PT at home. Has upper body strength so has been able to transfer himself to the car from the  with a board. Has been receiving mainly plt transfusions at [facility]. [facility] records reviewed       Interval History:     Sitting up in bed; eating lunch; states feeling better. Glad to be going home today. Didn't sleep well.  Denies N/V or SOB.    Wife at bedside. Current Facility-Administered Medications   Medication Dose Route Frequency    0.9% sodium chloride infusion 250 mL  250 mL IntraVENous PRN    0.9% sodium chloride infusion 250 mL  250 mL IntraVENous PRN    0.9% sodium chloride infusion 250 mL  250 mL IntraVENous PRN    0.9% sodium chloride infusion 250 mL  250 mL IntraVENous PRN    0.9% sodium chloride infusion 250 mL  250 mL IntraVENous PRN    sodium chloride (NS) flush 5-10 mL  5-10 mL IntraVENous Q8H    sodium chloride (NS) flush 5-10 mL  5-10 mL IntraVENous PRN    acetaminophen (TYLENOL) tablet 650 mg  650 mg Oral Q4H PRN    morphine injection 1 mg  1 mg IntraVENous Q4H PRN    naloxone (NARCAN) injection 0.4 mg  0.4 mg IntraVENous PRN    diphenhydrAMINE (BENADRYL) injection 12.5 mg  12.5 mg IntraVENous Q4H PRN    prochlorperazine (COMPAZINE) injection 5 mg  5 mg IntraVENous Q8H PRN    pantoprazole (PROTONIX) injection 40 mg  40 mg IntraVENous Q12H    acyclovir (ZOVIRAX) 200 mg/5 mL oral suspension 400 mg  400 mg Oral Q12H    midodrine (PROAMITINE) tablet 10 mg  10 mg Oral TID WITH MEALS    levoFLOXacin (LEVAQUIN) 750 mg in D5W IVPB  750 mg IntraVENous Q24H    metroNIDAZOLE (FLAGYL) IVPB premix 500 mg  500 mg IntraVENous Q8H    PHENYLephrine (NEOSYNEPHRINE) 30,000 mcg in 0.9% sodium chloride 250 mL infusion   mcg/min IntraVENous TITRATE    pregabalin (LYRICA) capsule 100 mg  100 mg Oral DAILY    0.9% sodium chloride infusion  50 mL/hr IntraVENous CONTINUOUS      No Known Allergies     Review of Systems: A complete review of systems was obtained, negative except as described above.     Physical Exam:     Visit Vitals    /67 (BP 1 Location: Left arm, BP Patient Position: Supine)    Pulse 71    Temp 98.3 °F (36.8 °C)    Resp 19    Ht 5' 5\" (1.651 m)    Wt 57 kg (125 lb 10.6 oz)    SpO2 99%    BMI 20.91 kg/m2     ECOG PS: 4  General: No distress  Eyes: PERRLA, anicteric sclerae  HENT: Atraumatic, OP clear  Neck: Supple  Respiratory:  normal respiratory effort  CV: Normal rate, regular rhythm, no murmurs, 1+ lower extremity edema  GI: Soft, nontender, nondistended, no masses, no hepatomegaly, no splenomegaly  Skin: No rashes, ecchymoses, or petechiae. Normal temperature, turgor, and texture. Psych: Alert, oriented, appropriate affect, normal judgment/insight  Neuro: CN II-XII intact    Results:     Lab Results   Component Value Date/Time    WBC 1.5 07/13/2017 02:47 AM    HGB 6.8 07/13/2017 02:47 AM    HCT 20.0 07/13/2017 02:47 AM    PLATELET 13 96/76/5100 02:47 AM    MCV 88.9 07/13/2017 02:47 AM    ABS. NEUTROPHILS 1.0 07/13/2017 02:47 AM     Lab Results   Component Value Date/Time    Sodium 140 07/13/2017 02:47 AM    Potassium 3.2 07/13/2017 02:47 AM    Chloride 106 07/13/2017 02:47 AM    CO2 22 07/13/2017 02:47 AM    Glucose 94 07/13/2017 02:47 AM    BUN 8 07/13/2017 02:47 AM    Creatinine 0.88 07/13/2017 02:47 AM    GFR est AA >60 07/13/2017 02:47 AM    GFR est non-AA >60 07/13/2017 02:47 AM    Calcium 7.9 07/13/2017 02:47 AM     Lab Results   Component Value Date/Time    Bilirubin, total 0.7 07/12/2017 04:24 AM    ALT (SGPT) 23 07/12/2017 04:24 AM    AST (SGOT) 34 07/12/2017 04:24 AM    Alk. phosphatase 92 07/12/2017 04:24 AM    Protein, total 4.7 07/12/2017 04:24 AM    Albumin 2.0 07/12/2017 04:24 AM    Globulin 2.7 07/12/2017 04:24 AM     Lab Results   Component Value Date/Time    MORRIS Poly POS 07/11/2017 05:50 AM     Lab Results   Component Value Date/Time    INR 1.1 07/11/2017 05:50 AM     7/11/2017 CT A/P W WO CONT  IMPRESSION:     1. Discontinuous multifocal colitis is most likely infectious or inflammatory. 2. No CT evidence of active intraluminal gastrointestinal hemorrhage. 3. Trace bilateral pleural effusions. Bibasilar atelectasis. 4. Bone findings are compatible with the provided diagnosis of multiple myeloma.   5.5 x 2.6 x 3.2 cm soft tissue mass arises from the right fifth rib and extends  into the right pleural space. 5. Subcentimeter flash filling hemangioma is in hepatic segment 8.  6. 2.7 cm right ureterocele extends into the lumen of the urinary bladder. As  result, there is mild right hydroureter. No hydronephrosis. Other incidental  findings are described above. If comparison imaging becomes available, I can addend this report.        Assessment and Recommendations:   1. GI bleed  CT with evidence of multifocal colitis; infectious vs inflammatory  GI consulted; patient and wife declining any additional testing at this time  abx per primary team      2. Multiple Myeloma, refractory (dx 2002)  Being followed by Dr Riley Steel at [facility]  Current treatment with combination Daratumumab and Pomalidomide po (1 mg). Received C#1 Daratumumab on July 6; plan was for weekly x 7 doses  Pomalidomide has not been initiated    Mr Nav Marx has follow up at [facility] in the am. Patient and wife informed. 3. Anemia, normocytic (s/p 4 units PRBCs)  Secondary to MM  Recommend continue supportive care; transfuse for Hgb < 7      4. Thrombocytopenia (s/p 3 unit plt)  Secondary to MM  Will order plt for today  Recommend continue supportive care; transfuse for plt < 50 with current GI bleed    The above exam and treatment plan were reviewed with Dr Nicole Conti.     Signed By: Alphonsus Prader, NP     July 13, 2017

## 2017-07-13 NOTE — DISCHARGE SUMMARY
Michael Burnette [facility] [address], [place], [address] [facility]  Tel: [phone]    Physician Discharge Summary    Patient ID:  Bernardo Godinez II  Age: 57 y.o.    : 1955  MRN: [id number]     PCP: None     Admit date: 2017    Discharge date: 2017    Principal admission Diagnosis:   Acute GI bleeding  GI BLEED    Discharge Diagnoses:  Principal Problem:    Acute GI bleeding (2017)    Anemia associated with acute blood loss (2017)    Thrombocytopenia ([address] (2017)    Pancytopenia due to chemotherapy ([address] (2017)    Acute colitis (2017)    Hypokalemia (2017)    Multiple myeloma ([address] (2017)    Hypotension, chronic (2017)    Consults: Pulmonary/Intensive care, GI and Hematology/Oncology    Hospital Course:     Mr. Felipe Kraus is a 57 y.o. admitted to [facility] and treated for the following:    Acute GI bleeding (2017): presumed to be due to colitis with underlying pancytopenia. CT abdomen showed multifocal colitis. Seen by GI but patient declines any further testing at our facility. Currently relatively stable. Continue omeprazole.        Pancytopenia due to chemotherapy ([address] (2017)/ Anemia associated with acute blood loss (2017)/ Thrombocytopenia ([address] (2017): he is sp 4 units PRBCs and 2 unit platelets and at discharge, Hgb was 9.8 and platelet count of 27.      Acute colitis (2017)/ Diarrhea (2017): resolving. Non bloody. Stool cultures unremarkable. Continue empiric metronidazole and levofloxacin       Hypotension, chronic (2017): worsened by diarrhea and volume depletion. Now at his baseline of sBP in the low 90s. Continue Midodrine.        Multiple myeloma ([address] (2017): usually follow up at [facility]. Seen by our Oncology team. He will follow up as scheduled tomorrow.       Hypokalemia (2017): repleted.       Discharge Exam:    Visit Vitals    /60    Pulse 68    Temp 98.4 °F (36.9 °C)    Resp 21    Ht 5' 5\" (1.651 m)    Wt 57 kg (125 lb 10.6 oz)    SpO2 98%    BMI 20.91 kg/m2      General: well looking and stable patient in no acute distress  Pulm: clear breath sounds without wheezes  Card: no murmurs, normal S1, S2 without thrills, bruits   Abd:    soft, non-tender, normoactive bowel sounds  Skin: no rashes or ulcers, skin turgor is good  Neuro: awake, alert and has a non focal     Activity: Activity as tolerated    Diet: Regular Diet    Current Discharge Medication List      START taking these medications    Details   metroNIDAZOLE (FLAGYL) 500 mg tablet Take 1 Tab by mouth three (3) times daily for 5 days. Qty: 15 Tab, Refills: 0      levoFLOXacin (LEVAQUIN) 750 mg tablet Take 1 Tab by mouth daily for 5 days. Qty: 5 Tab, Refills: 0         CONTINUE these medications which have NOT CHANGED    Details   pregabalin (LYRICA) 50 mg capsule Take 100 mg by mouth daily. midodrine (PROAMITINE) 10 mg tablet Take 10 mg by mouth three (3) times daily (with meals). montelukast (SINGULAIR) 10 mg tablet Take 10 mg by mouth two (2) days a week. The day before and day of immunotherapy. Based on current schedule, take on Thursday and Friday. Omeprazole delayed release (PRILOSEC D/R) 20 mg tablet Take 20 mg by mouth two (2) times a day. acyclovir (ZOVIRAX) 200 mg/5 mL suspension Take 400 mg by mouth every twelve (12) hours. dexamethasone (DECADRON) 4 mg tablet Take 4 mg by mouth Every Friday. The day after immunotherapy       OLANZapine (ZYPREXA) 5 mg tablet Take 5 mg by mouth nightly as needed. daratumumab (DARZALEX) 20 mg/mL injection by IntraVENous route Every Thursday. Immunotherapy administered at [facility]      multivitamin (ONE A DAY) tablet Take 1 Tab by mouth daily.              Follow-up Information     Follow up With Details Comments Contact Info    Dr Ester Nino as scheduled   None ([phone]) Patient stated that they have no PCP          Follow-up tests or labs: None    Discharge Condition: Stable    Disposition: home    Time taken to arrange discharge:  35 minutes.     Signed:  Alexus Mendoza MD     [facility]  7/13/2017   9:44 AM

## 2017-07-13 NOTE — DISCHARGE INSTRUCTIONS
HOSPITALIST DISCHARGE INSTRUCTIONS    NAME: Mateus Lee II   :  1955   MRN:  [id number]     Date:     2017    ADMIT DATE: 2017     DISCHARGE DATE: 2017     PRINCIPAL ADMITTING DIAGNOSIS:  Acute GI bleeding  GI BLEED    DISCHARGE DIAGNOSES:  Principal Problem:    Acute GI bleeding (2017)    Anemia associated with acute blood loss (2017)    Thrombocytopenia (HCC) (2017)    Hypokalemia (2017)    Pancytopenia due to chemotherapy ([facility] [address] (2017)    Multiple myeloma ([facility] [address] (2017)    Hypotension, chronic (2017)    Acute colitis (2017)    MEDICATIONS:    · It is important that medications are taken exactly as they are prescribed on the discharge medication instructions and keep them your  in the bottles provided by the pharmacist.   · Keep a list of the medication names, dosages, and times to be taken at all times. · Do not take other medications without consulting your doctor. Recommended diet:  Regular mechanical soft Diet    Recommended activity: Activity as tolerated    Post discharge care:    Notify follow up health care provider or return to the emergency department if you cannot get hold of your doctor if you feel worse or experience symptoms similar to those that brought you to hospital    Follow-up Information     Follow up With Details Comments Contact Info    Dr Kaylen Casas at [facility] as scheduled             Information obtained by :  I understand that if any problems occur once I am at home I am to contact my physician and I understand and acknowledge receipt of the instructions indicated above.                                                                                                                                            Physician's or R.N.'s Signature                                                                  Date/Time Patient or Representative Signature                                                          Date/Time

## 2017-07-14 LAB
BACTERIA SPEC CULT: NORMAL
BLD PROD TYP BPU: NORMAL
BPU ID: NORMAL
C JEJUNI+C COLI AG STL QL: NEGATIVE
E COLI SXT1+2 STL IA: NO GROWTH
SERVICE CMNT-IMP: NORMAL
STATUS OF UNIT,%ST: NORMAL
UNIT DIVISION, %UDIV: 0

## 2017-07-15 LAB
ABO + RH BLD: NORMAL
ANTI-COMPLEMENT (C3B,C3D): NORMAL
ANTIGENS PRESENT RBC DONR: NORMAL
BLD GP AB SCN SERPL QL ELUTION: NORMAL
BLD PROD TYP BPU: NORMAL
BLOOD BANK CMNT PATIENT-IMP: NORMAL
BLOOD GROUP ANTIBODIES SERPL: NORMAL
BLOOD GROUP ANTIBODIES SERPL: NORMAL
BPU ID: NORMAL
CROSSMATCH RESULT,%XM: NORMAL
DAT IGG-SP REAG RBC QL: NORMAL
DAT POLY-SP REAG RBC QL: NORMAL
PHYSICIAN INSTRUCTIO,%PI: NORMAL
SPECIMEN EXP DATE BLD: NORMAL
STATUS OF UNIT,%ST: NORMAL
UNIT DIVISION, %UDIV: 0

## 2019-12-02 NOTE — PROGRESS NOTES
Problem: Upper and Lower GI Bleed: Day 1  Goal: Diagnostic Test/Procedures  Outcome: Progressing Towards Goal  Receiving PRBCs now; however, still does not want procedures provided here. DISPLAY PLAN FREE TEXT